# Patient Record
Sex: MALE | Race: WHITE | NOT HISPANIC OR LATINO | Employment: OTHER | URBAN - METROPOLITAN AREA
[De-identification: names, ages, dates, MRNs, and addresses within clinical notes are randomized per-mention and may not be internally consistent; named-entity substitution may affect disease eponyms.]

---

## 2021-04-13 ENCOUNTER — HOSPITAL ENCOUNTER (INPATIENT)
Facility: HOSPITAL | Age: 71
LOS: 4 days | Discharge: HOME | End: 2021-04-17
Attending: PHYSICAL MEDICINE & REHABILITATION | Admitting: PHYSICAL MEDICINE & REHABILITATION

## 2021-04-13 PROBLEM — I61.9: Status: ACTIVE | Noted: 2021-04-13

## 2021-04-13 PROCEDURE — 94799 UNLISTED PULMONARY SVC/PX: CPT

## 2021-04-13 PROCEDURE — U0004 COV-19 TEST NON-CDC HGH THRU: HCPCS | Performed by: PHYSICAL MEDICINE & REHABILITATION

## 2021-04-13 PROCEDURE — 94640 AIRWAY INHALATION TREATMENT: CPT

## 2021-04-13 PROCEDURE — 94664 DEMO&/EVAL PT USE INHALER: CPT

## 2021-04-13 RX ORDER — IPRATROPIUM BROMIDE AND ALBUTEROL SULFATE 2.5; .5 MG/3ML; MG/3ML
3 SOLUTION RESPIRATORY (INHALATION) EVERY 6 HOURS PRN
Status: DISCONTINUED | OUTPATIENT
Start: 2021-04-13 | End: 2021-04-17 | Stop reason: HOSPADM

## 2021-04-13 RX ORDER — BUDESONIDE AND FORMOTEROL FUMARATE DIHYDRATE 160; 4.5 UG/1; UG/1
2 AEROSOL RESPIRATORY (INHALATION)
Status: DISCONTINUED | OUTPATIENT
Start: 2021-04-13 | End: 2021-04-17 | Stop reason: HOSPADM

## 2021-04-13 RX ORDER — EZETIMIBE 10 MG/1
10 TABLET ORAL DAILY
COMMUNITY
Start: 2021-02-08 | End: 2022-02-08

## 2021-04-13 RX ORDER — METOPROLOL TARTRATE 50 MG/1
100 TABLET, FILM COATED ORAL EVERY 12 HOURS SCHEDULED
Status: DISCONTINUED | OUTPATIENT
Start: 2021-04-13 | End: 2021-04-17 | Stop reason: HOSPADM

## 2021-04-13 RX ORDER — QUETIAPINE FUMARATE 25 MG/1
12.5 TABLET, FILM COATED ORAL NIGHTLY PRN
Status: DISCONTINUED | OUTPATIENT
Start: 2021-04-13 | End: 2021-04-17 | Stop reason: HOSPADM

## 2021-04-13 RX ORDER — METOPROLOL TARTRATE 100 MG/1
100 TABLET ORAL 2 TIMES DAILY
Status: ON HOLD | COMMUNITY
Start: 2021-04-13 | End: 2021-04-15

## 2021-04-13 RX ORDER — QUETIAPINE FUMARATE 25 MG/1
12.5 TABLET, FILM COATED ORAL NIGHTLY PRN
Status: ON HOLD | COMMUNITY
Start: 2021-04-13 | End: 2021-04-15

## 2021-04-13 RX ORDER — HYDRALAZINE HYDROCHLORIDE 10 MG/1
10 TABLET, FILM COATED ORAL EVERY 6 HOURS PRN
Status: DISCONTINUED | OUTPATIENT
Start: 2021-04-13 | End: 2021-04-14

## 2021-04-13 RX ORDER — BUDESONIDE AND FORMOTEROL FUMARATE DIHYDRATE 160; 4.5 UG/1; UG/1
2 AEROSOL RESPIRATORY (INHALATION) 2 TIMES DAILY
Status: ON HOLD | COMMUNITY
Start: 2021-04-13 | End: 2021-04-15

## 2021-04-13 RX ORDER — FLUTICASONE PROPIONATE 50 MCG
1 SPRAY, SUSPENSION (ML) NASAL DAILY
Status: DISCONTINUED | OUTPATIENT
Start: 2021-04-14 | End: 2021-04-17 | Stop reason: HOSPADM

## 2021-04-13 RX ORDER — LISINOPRIL 40 MG/1
40 TABLET ORAL DAILY
Status: ON HOLD | COMMUNITY
Start: 2021-04-13 | End: 2021-04-15

## 2021-04-13 RX ORDER — LISINOPRIL 40 MG/1
40 TABLET ORAL
Status: DISCONTINUED | OUTPATIENT
Start: 2021-04-14 | End: 2021-04-17 | Stop reason: HOSPADM

## 2021-04-13 RX ADMIN — METOPROLOL TARTRATE 100 MG: 50 TABLET, FILM COATED ORAL at 20:01

## 2021-04-13 RX ADMIN — BUDESONIDE AND FORMOTEROL FUMARATE DIHYDRATE 2 PUFF: 160; 4.5 AEROSOL RESPIRATORY (INHALATION) at 20:42

## 2021-04-13 RX ADMIN — QUETIAPINE FUMARATE 12.5 MG: 25 TABLET ORAL at 22:47

## 2021-04-13 RX ADMIN — HYDRALAZINE HYDROCHLORIDE 10 MG: 10 TABLET, FILM COATED ORAL at 21:29

## 2021-04-13 NOTE — PLAN OF CARE
Goal Outcome Evaluation:  Plan of Care Reviewed With: patient     Outcome Summary: A&OX4. Calm, cooperative, and pleasant. Ate dinner on the unit. Came to the unit at 5:30 P.M. Diet: Reg, healthy heart, thin liquids. Denied pain. Full code. Can walk on own well. No numness or tingling. Continent B&B. Last BM today. Forgetful and disoriented at times.

## 2021-04-13 NOTE — PROGRESS NOTES
Inpatient Rehabilitation Plan of Care Note    Plan of Care  Care Plan Reviewed - Updates as Follows    Psychosocial    [RN] Coping/Adjustment(Active)  Current Status(04/13/2021): Expresses appropriate coping  Weekly Goal(04/20/2021): Allow opportunity to express concerns regarding current  status  Discharge Goal: Demonstrate healthy coping strategies        Safety    [RN] Potential for Injury(Active)  Current Status(04/13/2021): Pt is forgetful/impulsive  Weekly Goal(04/20/2021): Free from falls  Discharge Goal: Pt/family will be aware of risk of falls and safety in the home        Sphincter Control    [RN] Bladder Management(Active)  Current Status(04/13/2021): Continent 100%  Weekly Goal(04/20/2021): Continent 100%  Discharge Goal: Continent 100%    [RN] Bowel Management(Active)  Current Status(04/13/2021): Continent 100%  Weekly Goal(04/20/2021): Continent 100%  Discharge Goal: Continent 100%    Signed by: Ishmael Correa RN

## 2021-04-13 NOTE — H&P
[facility]   HISTORY AND PHYSICAL    Patient Name: hCad Hansen  : 1950  MRN: [id number]  Primary Care Physician: Marianna Billings APRN  Date of admission: 2021    Subjective   Subjective     Chief Complaint:   Status post right frontoparietal intraparenchymal hemorrhage-4.5 x 1.7 cm-2021  Hypertension-lisinopril/metoprolol/as needed hydralazine  Emphysema-bronchodilator inhaler  Impaired cognition/mobility/self-care  Sundowning-Seroquel low-dose at night  DVT prophylaxis-SCDs      History of Present Illness  Patient is a 71-year-old male who was admitted to [facility] on 2021 with a right frontal intraparenchymal hemorrhage with surrounding edema and brain compression.  Neurology was consulted.  Diagnostic angiogram did not reveal any source of the bleeding.  No aneurysm.  Plans for repeat MRI as an outpatient in 3 months.  Patient was initially on a Cardene drip for hypertension.  Started on lisinopril which was titrated up.  Also on a beta-blocker added as well.  He has had as needed labetalol and hydralazine.  Overall goal systolic blood pressure less than 140.  He had prolonged QT that improved with IV magnesium.  Has had sundowning that improved with low-dose Seroquel.  His wife is to stay with him the first night on the rehab unit.  He is in a room close to the nurses station.  Emphysema was noted on his chest x-ray.  Pulmonary recommended outpatient pulmonary function test and also 6-minute walk test before discharge from rehab.    The patient has shown improvement in his cognition.  He is oriented.  Does not describe any significant headache.  Does not describe any focal vision complaints or focal weakness or numbness.  He has had some impulsivity.  His wife attributes some of that to being on the diuretic at the outside hospital.  Has had expressive aphasia that shown improvement.  He is ambulated contact-guard min assist, unsteady at times.  Required  assistance with lower body activities of daily living.  Given his functional impairments and comorbidities he is admitted for acute inpatient rehabilitation.  Review of Systems   10 point review of systems reviewed as per his HPI.  He has had a rash on his back for the past month, evaluated by dermatologist as an outpatient with biopsy.  No significant headache.  No vision changes.  No swallowing complaints.  Comfortable with his breathing.  No abdominal complaints.  No bladder complaints.  Denies any focal weakness.  Personal History     Past Medical History:   Diagnosis Date   • Arthritis    • Asthma    • Elevated cholesterol    • Hypertension    • Stroke (CMS/HCC)        Past Surgical History:   Procedure Laterality Date   • ABDOMINAL SURGERY     • APPENDECTOMY     • COLONOSCOPY     • EYE SURGERY     • FRACTURE SURGERY     • TONSILLECTOMY         Family History: family history includes Cancer in his brother and father; Diabetes in his maternal grandmother. Otherwise pertinent FHx was reviewed and not pertinent to current issue.    Social History:  reports that he has been smoking. He started smoking about 26 years ago. He has a 6.25 pack-year smoking history. He has never used smokeless tobacco. He reports current alcohol use of about 1.0 standard drinks of alcohol per week. He reports that he does not use drugs.  Patient is  lives with his wife in a ranch style home, 2 steps to enter.  Home Medications:  QUEtiapine, budesonide-formoterol, ezetimibe, lisinopril, and metoprolol tartrate      Allergies:  Allergies   Allergen Reactions   • Pravastatin Hives     Rash/ itching    • Hydrochlorothiazide Rash   Scheduled Meds:budesonide-formoterol, 2 puff, Inhalation, BID - RT  fluticasone, 1 spray, Each Nare, Daily  lisinopril, 40 mg, Oral, Q24H  metoprolol tartrate, 100 mg, Oral, Q12H      Continuous Infusions:   PRN Meds:.hydrALAZINE  •  ipratropium-albuterol  •  QUEtiapine      Objective    Objective      Vitals:  Temp:  [97 °F (36.1 °C)] 97 °F (36.1 °C)  Heart Rate:  [68] 68  Resp:  [18] 18  BP: (181)/(84) 181/84    Physical Exam  MENTAL STATUS -  AWAKE / ALERT  HEENT- NCAT, PUPILS EQUALLY ROUND, SCLERAE ANICTERIC, CONJUNCTIVAE PINK, OP MOIST, NO JVD, EARS UNREMARKABLE EXTERNALLY  LUNGS - CTA, NO WHEEZES, RALES OR RHONCHI  HEART- RRR, NO RUB, MURMUR, OR GALLOP  ABD - NORMOACTIVE BOWEL SOUNDS, SOFT, NT. NO HEPATOSPLENOMEGALY APPRECIATED  EXT - NO EDEMA OR CYANOSIS  NEURO -oriented to person place time and situation.  Appropriate.  Speech is fluent.  Visual fields intact in all 4 quadrants.  Extraocular movements intact.  Good coordination in his hands.  MOTOR EXAM - RUE/RLE 5/5. LUE/LLE 5/5.      Result Review    Result Review:    April 12-sodium 136, potassium 3.3, chloride 105, carbon dioxide 27, glucose 101, blood urea nitrogen 16, creatinine 1.13  April 8-hemoglobin A1c 5.3  April 7-WBC 14.4, hemoglobin 12.9, hematocrit 30.6, platelets 188.  April 5-total protein 7.4, BUN 4.4, calcium 8.9, total bili 0.6, AST 31, ALT 26, alkaline phosphatase 79     CT head without contrast,     04/06/2021 at 0537 hours     HISTORY: Right frontal intraparenchymal hemorrhage.         TECHNIQUE: Multiple axial images were obtained from the skullbase through the vertex. Images were obtained without IV contrast. The radiation dose reduction technique was used to obtain ALARA for the exam.     COMPARISON: April 5, 2021     FINDINGS: There has been no interval change in the right frontal intrathecal hematoma. No new hemorrhage is demonstrated. No intraventricular hemorrhage is seen. Mass effect is seen upon the right frontal horn. Small amount of subarachnoid hemorrhage is   demonstrated.     Motion mildly degrades the study. Stable ischemic white matter disease and atrophy. Bilateral lens extractions have been performed. The paranasal sinuses are clear. No mastoid or middle ear effusion is demonstrated.     IMPRESSION:   1. Stable  right frontal intraparenchymal hematoma  ================================    MRI of the brain with and without contrast, MRI cervical spine with and without contrast,     04/05/2021 at 1330 hours.     HISTORY: Headache, chronic, no new features; Altered mental status.  Neck pain, abnormal neuro exam. According to the patient's electronic medical record, the patient has acute intracranial hemorrhage on recent CT imaging.     TECHNIQUE: Multiplanar and multisequence imaging was obtained of the brain and cervical spine on a 3 Karime magnet. Images were obtained pre-and-post administration of IV gadolinium.     COMPARISON: CT head and CTA head/neck exams dated 4/5/2021.     FINDINGS:     BRAIN: Images are degraded by motion artifact on multiple sequences. A heterogeneous parenchymal hematoma is reidentified centered within the anterior/paramedian right frontal lobe, which is not significantly changed in size compared to the most recent   CT head exam from 4/5/2021, measuring approximately 4.5 x 1.7 cm (reference image 25/series 6). An internal fluid/fluid layer is demonstrated. There is significant associated abnormal nodular enhancement is demonstrated. There is surrounding parenchymal   T2/FLAIR signal, compatible with edema, and persistent associated local mass effect with local sulcal effacement and partial effacement of the anterior body of the right lateral ventricle.     A small amount of blood products is identified layering within the dependent portions of the lateral ventricles, which may be related to some redistribution. Thin, subtle extra-axial intrinsic T1 shortening, gradient susceptibility artifact, and   incomplete suppression of FLAIR signal intensity is demonstrated along the bilateral frontal convexities, compatible with thin subdural hematomas.  There is susceptibility artifact extending along the anterior/inferior interhemispheric falx and adjacent   cerebral sulci, also compatible with small  amounts of subdural and subarachnoid hemorrhage. Gradient susceptibility artifact is also demonstrated along a few left frontoparietal, and to lesser degree left temporal, sulci (reference image 47/series 9),   which may represent sequela of prior subarachnoid hemorrhage. A subcentimeter focus of intrinsic T1 shortening with corresponding heterogeneous gradient susceptibility artifact is identified within the anterior left frontal lobe (reference image   39/series 9 and image 25/series 5), which may represent a small subacute parenchymal hematoma, which is not associated with significant mass effect or significant surrounding parenchymal edema.     There is no evidence of hydrocephalus. There is no significant midline shift. The basal cisterns are intact.       Foci of hyperintense T2/FLAIR signal are identified within the periventricular and subcortical supratentorial white matter, which are nonspecific, but likely represent a background of mild to moderate chronic small vessel ischemic changes. There is no   abnormal parenchymal enhancement.  There are additional punctate foci of gradient susceptibility artifact within the bilateral temporal lobes, left occipital lobe, and left parietal lobe, which may represent small foci of chronic hemosiderin deposition   and/or mineralization. There is no parenchymal restricted diffusion to suggest acute ischemia/infarct.       Post procedural changes of prior bilateral ocular lens extractions are noted. The orbits and pituitary gland are within normal limits. There is no evidence of Chiari malformation or syrinx. The sagittal T1 image demonstrates normal marrow signal   intensity in the calvarium, skull base, and upper cervical spine.  The expected flow voids for the proximal intracranial arteries and dural sinuses are maintained.  There is mild mucosal thickening within scattered ethmoid air cells as well as the   frontal, maxillary, and sphenoid sinuses. No significant  fluid is seen in the mastoid air cells.       IMPRESSION:   1. A parenchymal hematoma is reidentified centered within the anterior/paramedian right frontal lobe, not significantly changed in size or configuration compared to the 4/5/2021 CT head exam at 1334 hours. There is similar associated parenchymal edema   and local mass effect, without significant midline shift. No associated abnormal nodular enhancement is demonstrated.   2. There is a small amount of intraventricular hemorrhage with thin bilateral subdural hematomas overlying the frontal convexities and anterior interhemispheric falx, without significant associated mass effect or hydrocephalus. Continued follow-up is   recommended.   3. The subcentimeter focus of heterogeneous intrinsic T1 shortening and heterogeneous gradient susceptibility artifact within the left frontal lobe could represent a small subacute parenchymal hematoma, which is not associated with significant mass   effect. Continued attention to this region on follow-up imaging is recommended.   4. Foci of parenchymal susceptibility artifact and evidence of prior subarachnoid hemorrhage are nonspecific findings, but could be seen in the setting of cerebral amyloid angiopathy, among other etiologies. Foci of parenchymal susceptibility artifact   can also be seen in the setting of chronic hypertensive microhemorrhage. Clinical correlation is recommended.   5. Findings compatible with a background of mild to moderate chronic small vessel ischemic changes.       Assessment/Plan   Assessment / Plan        Status post right frontoparietal intraparenchymal hemorrhage-4.5 x 1.7 cm-April 5, 2021  Impaired cognition/impaired mobility/impaired self-care   hypertension-lisinopril/metoprolol/as needed hydralazine  Emphysema-bronchodilator inhaler  Impaired cognition/mobility/self-care  Sundowning-Seroquel low-dose at night  DVT prophylaxis-SCDs    Now admit for comprehensive acute inpatient rehabilitation  .  This would be an interdisciplinary program with physical therapy 1 hour,  occupational therapy 1 hour, and speech therapy 1 hour, 5 days a week.  Rehabilitation nursing for carryover, monitoring of hypertension and neurologic   status, bowel and bladder, and skin  Ongoing physician follow-up.  Weekly team conferences.  Goals are to achieve a level of supervision with  mobility and self-care and improved balance.   Rehabilitation prognosis fair.  Medical prognosis fair.  Estimated length of stay is approximately 10 days, but is only an estimation.   The patient's functional status and clinical status is unchanged from preadmission assessment and the patient continues appropriate for acute inpatient rehabilitation.  Goal is for home with outpatient   therapies.  Barrier to discharge: Impaired mobility self-care- work on balance transfers gait ADLs to overcome.     Admission Status:  I believe this patient meets inpatient status.    Electronically signed by Nomi Ibarra MD, 04/13/21, 7:18 PM EDT.    During rounds, used appropriate personal protective equipment including mask and gloves.  Additional gown if indicated.  Mask used was standard procedure mask. Appropriate PPE was worn during the entire visit.  Hand hygiene was completed before and after.

## 2021-04-13 NOTE — PROGRESS NOTES
SECTION GG    Eating Performance: [place] sets up or cleans up; patient completes activity.  [place] assists only prior to or following the activity.    Eating Discharge Goals: Patient completed the activities by him/herself with no  assistance from a helper.    Signed by: Ishmael Correa RN

## 2021-04-14 LAB
ALBUMIN SERPL-MCNC: 3.1 G/DL (ref 3.5–5.2)
ALBUMIN/GLOB SERPL: 1.1 G/DL
ALP SERPL-CCNC: 131 U/L (ref 39–117)
ALT SERPL W P-5'-P-CCNC: 131 U/L (ref 1–41)
ANION GAP SERPL CALCULATED.3IONS-SCNC: 7 MMOL/L (ref 5–15)
AST SERPL-CCNC: 68 U/L (ref 1–40)
BASOPHILS # BLD AUTO: 0.1 10*3/MM3 (ref 0–0.2)
BASOPHILS NFR BLD AUTO: 1 % (ref 0–1.5)
BILIRUB SERPL-MCNC: 0.3 MG/DL (ref 0–1.2)
BUN SERPL-MCNC: 17 MG/DL (ref 8–23)
BUN/CREAT SERPL: 16.7 (ref 7–25)
CALCIUM SPEC-SCNC: 8.7 MG/DL (ref 8.6–10.5)
CHLORIDE SERPL-SCNC: 102 MMOL/L (ref 98–107)
CO2 SERPL-SCNC: 29 MMOL/L (ref 22–29)
CREAT SERPL-MCNC: 1.02 MG/DL (ref 0.76–1.27)
DEPRECATED RDW RBC AUTO: 43.7 FL (ref 37–54)
EOSINOPHIL # BLD AUTO: 0.81 10*3/MM3 (ref 0–0.4)
EOSINOPHIL NFR BLD AUTO: 7.9 % (ref 0.3–6.2)
ERYTHROCYTE [DISTWIDTH] IN BLOOD BY AUTOMATED COUNT: 12.3 % (ref 12.3–15.4)
GFR SERPL CREATININE-BSD FRML MDRD: 72 ML/MIN/1.73
GLOBULIN UR ELPH-MCNC: 2.7 GM/DL
GLUCOSE SERPL-MCNC: 103 MG/DL (ref 65–99)
HCT VFR BLD AUTO: 37.3 % (ref 37.5–51)
HGB BLD-MCNC: 12.8 G/DL (ref 13–17.7)
IMM GRANULOCYTES # BLD AUTO: 0.07 10*3/MM3 (ref 0–0.05)
IMM GRANULOCYTES NFR BLD AUTO: 0.7 % (ref 0–0.5)
LYMPHOCYTES # BLD AUTO: 1.72 10*3/MM3 (ref 0.7–3.1)
LYMPHOCYTES NFR BLD AUTO: 16.7 % (ref 19.6–45.3)
MCH RBC QN AUTO: 33.2 PG (ref 26.6–33)
MCHC RBC AUTO-ENTMCNC: 34.3 G/DL (ref 31.5–35.7)
MCV RBC AUTO: 96.9 FL (ref 79–97)
MONOCYTES # BLD AUTO: 1.32 10*3/MM3 (ref 0.1–0.9)
MONOCYTES NFR BLD AUTO: 12.9 % (ref 5–12)
NEUTROPHILS NFR BLD AUTO: 6.25 10*3/MM3 (ref 1.7–7)
NEUTROPHILS NFR BLD AUTO: 60.8 % (ref 42.7–76)
NRBC BLD AUTO-RTO: 0 /100 WBC (ref 0–0.2)
PLATELET # BLD AUTO: 255 10*3/MM3 (ref 140–450)
PMV BLD AUTO: 10.6 FL (ref 6–12)
POTASSIUM SERPL-SCNC: 4.4 MMOL/L (ref 3.5–5.2)
PROT SERPL-MCNC: 5.8 G/DL (ref 6–8.5)
RBC # BLD AUTO: 3.85 10*6/MM3 (ref 4.14–5.8)
SARS-COV-2 RNA RESP QL NAA+PROBE: NOT DETECTED
SODIUM SERPL-SCNC: 138 MMOL/L (ref 136–145)
WBC # BLD AUTO: 10.27 10*3/MM3 (ref 3.4–10.8)

## 2021-04-14 PROCEDURE — 94799 UNLISTED PULMONARY SVC/PX: CPT

## 2021-04-14 PROCEDURE — 97161 PT EVAL LOW COMPLEX 20 MIN: CPT

## 2021-04-14 PROCEDURE — 80053 COMPREHEN METABOLIC PANEL: CPT | Performed by: PHYSICAL MEDICINE & REHABILITATION

## 2021-04-14 PROCEDURE — 97166 OT EVAL MOD COMPLEX 45 MIN: CPT | Performed by: OCCUPATIONAL THERAPIST

## 2021-04-14 PROCEDURE — 96125 COGNITIVE TEST BY HC PRO: CPT

## 2021-04-14 PROCEDURE — 97112 NEUROMUSCULAR REEDUCATION: CPT

## 2021-04-14 PROCEDURE — 85025 COMPLETE CBC W/AUTO DIFF WBC: CPT | Performed by: PHYSICAL MEDICINE & REHABILITATION

## 2021-04-14 PROCEDURE — 97535 SELF CARE MNGMENT TRAINING: CPT | Performed by: OCCUPATIONAL THERAPIST

## 2021-04-14 RX ORDER — HYDRALAZINE HYDROCHLORIDE 10 MG/1
20 TABLET, FILM COATED ORAL EVERY 6 HOURS PRN
Status: DISCONTINUED | OUTPATIENT
Start: 2021-04-14 | End: 2021-04-17 | Stop reason: HOSPADM

## 2021-04-14 RX ORDER — AMLODIPINE BESYLATE 5 MG/1
5 TABLET ORAL
Status: DISCONTINUED | OUTPATIENT
Start: 2021-04-14 | End: 2021-04-17 | Stop reason: HOSPADM

## 2021-04-14 RX ADMIN — LISINOPRIL 40 MG: 40 TABLET ORAL at 07:40

## 2021-04-14 RX ADMIN — AMLODIPINE BESYLATE 5 MG: 5 TABLET ORAL at 21:17

## 2021-04-14 RX ADMIN — METOPROLOL TARTRATE 100 MG: 50 TABLET, FILM COATED ORAL at 21:17

## 2021-04-14 RX ADMIN — BUDESONIDE AND FORMOTEROL FUMARATE DIHYDRATE 2 PUFF: 160; 4.5 AEROSOL RESPIRATORY (INHALATION) at 22:29

## 2021-04-14 RX ADMIN — BUDESONIDE AND FORMOTEROL FUMARATE DIHYDRATE 2 PUFF: 160; 4.5 AEROSOL RESPIRATORY (INHALATION) at 07:43

## 2021-04-14 RX ADMIN — QUETIAPINE FUMARATE 12.5 MG: 25 TABLET ORAL at 21:17

## 2021-04-14 RX ADMIN — METOPROLOL TARTRATE 100 MG: 50 TABLET, FILM COATED ORAL at 07:39

## 2021-04-14 RX ADMIN — FLUTICASONE PROPIONATE 1 SPRAY: 50 SPRAY, METERED NASAL at 07:35

## 2021-04-14 RX ADMIN — HYDRALAZINE HYDROCHLORIDE 10 MG: 10 TABLET, FILM COATED ORAL at 15:48

## 2021-04-14 NOTE — PROGRESS NOTES
"Section B. Hearing, Speech, Vision: Expression of Ideas and Wants: Expresses  complex messages without difficulty and with speech that is clear and easy to  understand.  Understanding Verbal and Non-Verbal Content: Understands: Clear comprehension  without cues or repetitions.    Section C. Cognitive Patterns: Brief Interview for Mental Status (BIMS) was  conducted.  Repetition of Three Words: Three words  Able to report correct year: Correct  Able to report correct month: Accurate within 5 days  Able to report correct day of the week: Correct  Able to recall \"sock\": Yes, no cue required  Able to recall \"blue\": Yes, no cue required  Able to recall \"bed\": Yes, no cue required    BIMS SUMMARY SCORE: 15 Cognitively intact Patient was able to complete the Brief  Interview for Mental Status    Signed by: Janice Morocho, SLP    "

## 2021-04-14 NOTE — PLAN OF CARE
Goal Outcome Evaluation:      Slept poorly. Up to void every 1hr., denies burning or pain.  Wife at bedside. Answered orientation questions, but forgetful & disoriented at times. Wears red arm band. Gait mostly steady without equipment. Continent of B&B. Meds whole with water.

## 2021-04-14 NOTE — THERAPY EVALUATION
Inpatient Rehabilitation - Speech Language Pathology Initial Evaluation    [facility]     Patient Name: Chad Hansen  : 1950  MRN: [id number]    Today's Date: 2021                   Admit Date: 2021       Visit Dx:    No diagnosis found.    Patient Active Problem List   Diagnosis   • Nontraumatic acute cerebral hemorrhage (CMS/HCC)       Past Medical History:   Diagnosis Date   • Arthritis    • Asthma    • Elevated cholesterol    • Hypertension    • Stroke (CMS/HCC)        Past Surgical History:   Procedure Laterality Date   • ABDOMINAL SURGERY     • APPENDECTOMY     • COLONOSCOPY     • EYE SURGERY     • FRACTURE SURGERY     • TONSILLECTOMY       Patient was wearing a face mask during this therapy encounter. Therapist used appropriate personal protective equipment including mask, eye protection and gloves.  Mask used was standard procedure mask. Appropriate PPE was worn during the entire therapy session. Hand hygiene was completed before and after therapy session. Patient is not in enhanced droplet precautions.                SLP EVALUATION (last 72 hours)      SLP SLC Evaluation     Row Name 21 0830       Communication Assessment/Intervention    Document Type  evaluation  -AL    Subjective Information  no complaints  -AL    Patient Observations  alert;cooperative  -AL    Patient/Family/Caregiver Comments/Observations  Pt participated well. Denies changes in cognition or word finding.  -AL    Patient Effort  good  -AL    Symptoms Noted During/After Treatment  none  -AL       General Information    Patient Profile Reviewed  yes  -AL    Pertinent History Of Current Problem  Pt admitted to rehab under diagnosis Intraparenchymal Hemorrhage. CT at previous hospital  showed acute right frontal intraparenchymal hemorrhage causing local mass effect and moderate surrounding vasogenic edema, additional extra-axial hemorrhage tracking along falx, along with intraventricular hemorrhage right  occipital horn 4th ventrical.   -AL    Precautions/Limitations, Vision  WFL;for purposes of eval  -AL    Precautions/Limitations, Hearing  other (see comments) requires repetitions, suspect [place]  -AL    Patient Level of Education  Unknown; pt retired  -AL    Prior Level of Function-Communication  WFL  -AL    Plans/Goals Discussed with  patient  -AL    Barriers to Rehab  none identified  -AL    Patient's Goals for Discharge  return to all previous roles/activities  -AL    Standardized Assessment Used  CLQT  -AL       Motor Speech Assessment/Intervention    Motor Speech Function  WNL  -AL       Cognitive Assessment Intervention- SLP    Cognition, Comment  BIMS administered. Pt recalled 3/3 unrelated words immediately and after 2 minute delay. Pt oriented to month, year and WILLIAN. Pt unable to recall details of stroke.  Short-term memory for 3 errands after 10 minutes, complex verbal situational problem solving and abstract convergent thinking appeared WFL. Attention to detail for medicine label appeared WFL. Attention to detial for bill pay: required MOD-NO cues (3/8 with NO cues, 8/8 with MIN-MOD cues). Moderately complex temporal problem solvin% with NO cues, 100% with MIN-MOD cues. Suspect working memory deficits impacted.    -AL       Standardized Tests    Cognitive/Memory Tests  CLQT: Cognitive Linguistic Quick Test  -AL       CLQT (The Cognitive Linguistic Quick Test)    Attention Domain Score  122  -AL    Attention Severity Rating  3: Mild  -AL    Memory Domain Score  108  -AL    Memory Severity Rating  2: Moderate  -AL    Executive Function Domain Score  17  -AL    Executive Function Severity Rating  3: Mild  -AL    Language Domain Score  25  -AL    Language Severity Rating  3: Mild  -AL    Visuospatial Domain Score  57  -AL    Visuospatial Severity Rating  3: Mild  -AL    Clock Drawing Total Score  13  -AL    Clock Drawing Severity Rating  WNL  -AL    Composite Severity Rating  2.8  -AL    Composite  Severity Rating Range  3.4 - 2.5: Mild  -AL    CLQT Comments  Pt scored below the criterion cut-off score on symbol cancellation, story retelling, symbol trails, generative naming, design memory, and design generation. Reduced attention to detail noted; however, no overt inattention to left or right observed. Suspect [place] status impacted performance on story retelling task.   -AL       SLP Clinical Impressions    SLP Diagnosis  Mild cognitive-linguistic impairment  -AL    Rehab Potential/Prognosis  excellent  -AL    SLC Criteria for Skilled Therapy Interventions Met  yes  -AL    Functional Impact  need frequent supervision;difficulty completing home management task  -AL       Recommendations    Therapy Frequency (SLP SLC)  2 times/day;5 days per week  -AL    Predicted Duration Therapy Intervention (Days)  until discharge  -AL    Anticipated Discharge Disposition (SLP)  home with  services  -AL       Communication Treatment Objective and Progress Goals (SLP)    Cognitive Linguistic Treatment Objectives  Cognitive Linguistic Treatment Objectives (Group)  -AL       Cognitive Linguistic Treatment Objectives    Attention Selection  attention, SLP goal 1  -AL    Memory Skills Selection  memory skills, SLP goal 1  -AL    Organizational Skills Selection  organizational skills, SLP goal 1  -AL    Reasoning Selection  reasoning, SLP goal 1  -AL    Functional Math Skills Selection  functional math skills, SLP goal 1  -AL    Executive Function Skills Selection  executive function skills, SLP goal 1  -AL       Attention Goal 1 (SLP)    Improve Attention by Goal 1 (SLP)  complete selective attention task;80%;independently (over 90% accuracy)  -AL    Time Frame (Attention Goal 1, SLP)  1 week  -AL       Memory Skills Goal 1 (SLP)    Improve Memory Skills Through Goal 1 (SLP)  listen to a paragraph and answer questions;recall details of the day;80%;independently (over 90% accuracy)  -AL    Time Frame (Memory Skills Goal 1, SLP)  1  week  -AL       Organizational Skills Goal 1 (SLP)    Improve Thought Organization Through Goal 1 (SLP)  completing a divergent naming task;naming similarities and differences;80%;independently (over 90% accuracy)  -AL    Time Frame (Thought Organization Skills Goal 1, SLP)  1 week  -AL       Reasoning Goal 1 (SLP)    Improve Reasoning Through Goal 1 (SLP)  complete deductive reasoning task;complete mental flexibility task;80%;independently (over 90% accuracy)  -AL    Time Frame (Reasoning Goal 1, SLP)  1 week  -AL       Functional Math Skills Goal 1 (SLP)    Improve Functional Math Skills Through Goal 1 (SLP)  complete word problems involving time;complete word problems involving money;80%;independently (over 90% accuracy)  -AL    Time Frame (Functional Math Skills Goal 1, SLP)  1 week  -AL       Executive Functional Skills Goal 1 (SLP)    Improve Executive Function Skills Goal 1 (SLP)  demonstrate awareness of deficit;identify strategies, strengths, limitations;home management activity;80%;independently (over 90% accuracy)  -AL    Time Frame (Executive Function Skills Goal 1, SLP)  1 week  -AL      User Key  (r) = Recorded By, (t) = Taken By, (c) = Cosigned By    Initials Name Effective Dates    Janice Sanchez, MS CCC-SLP 08/30/19 -              EDUCATION    The patient has been educated in the following areas:       Cognitive Impairment.      SLP Recommendation and Plan      SLP Diagnosis: Mild cognitive-linguistic impairment     Rehab Potential/Prognosis: excellent     Anticipated Discharge Disposition (SLP): home with OP services        Predicted Duration Therapy Intervention (Days): until discharge           Progress: improving  Outcome Summary: Cognitive-Linguistic Quick Test (CLQT) administered. Pt scored an overall composite severity rating of 2.8 out of 4, indicating a mild cognitive-linguistic impairment for his age. He scored mildly impaired in the domains of attention, executive functions,  language and visuospatial skills and moderately impaired in the domain of memory. He exhibited difficulty with story retelling, alternating attention for symbol trails, symbol cancellation, generative naming, design memory and design generation.          SLP GOALS     Row Name 04/14/21 0830             Attention Goal 1 (SLP)    Improve Attention by Goal 1 (SLP)  complete selective attention task;80%;independently (over 90% accuracy)  -AL      Time Frame (Attention Goal 1, SLP)  1 week  -AL         Memory Skills Goal 1 (SLP)    Improve Memory Skills Through Goal 1 (SLP)  listen to a paragraph and answer questions;recall details of the day;80%;independently (over 90% accuracy)  -AL      Time Frame (Memory Skills Goal 1, SLP)  1 week  -AL         Organizational Skills Goal 1 (SLP)    Improve Thought Organization Through Goal 1 (SLP)  completing a divergent naming task;naming similarities and differences;80%;independently (over 90% accuracy)  -AL      Time Frame (Thought Organization Skills Goal 1, SLP)  1 week  -AL         Reasoning Goal 1 (SLP)    Improve Reasoning Through Goal 1 (SLP)  complete deductive reasoning task;complete mental flexibility task;80%;independently (over 90% accuracy)  -AL      Time Frame (Reasoning Goal 1, SLP)  1 week  -AL         Functional Math Skills Goal 1 (SLP)    Improve Functional Math Skills Through Goal 1 (SLP)  complete word problems involving time;complete word problems involving money;80%;independently (over 90% accuracy)  -AL      Time Frame (Functional Math Skills Goal 1, SLP)  1 week  -AL         Executive Functional Skills Goal 1 (SLP)    Improve Executive Function Skills Goal 1 (SLP)  demonstrate awareness of deficit;identify strategies, strengths, limitations;home management activity;80%;independently (over 90% accuracy)  -AL      Time Frame (Executive Function Skills Goal 1, SLP)  1 week  -AL        User Key  (r) = Recorded By, (t) = Taken By, (c) = Cosigned By    Initials  Name Provider Type    Janice Sanchez MS CCC-SLP Speech and Language Pathologist             SLP Outcome Measures (last 72 hours)      SLP Outcome Measures     Row Name 04/14/21 1500             SLP Outcome Measures    Outcome Measure Used?  Adult NOMS  -AL         Adult FCM Scores    FCM Chosen  Attention;Memory  -AL      Attention FCM Score  5  -AL      Memory FCM Score  5  -AL        User Key  (r) = Recorded By, (t) = Taken By, (c) = Cosigned By    Initials Name Effective Dates    Janice Sanchez MS CCC-SLP 08/30/19 -                   Time Calculation:       Time Calculation- SLP     Row Name 04/14/21 1554 04/14/21 1436          Time Calculation- SLP    SLP Start Time  1230  -AL  0830  -AL     SLP Stop Time  1300  -AL  0900  -AL     SLP Time Calculation (min)  30 min  -AL  30 min  -AL       User Key  (r) = Recorded By, (t) = Taken By, (c) = Cosigned By    Initials Name Provider Type    Janice Sanchez MS CCC-SLP Speech and Language Pathologist            Therapy Charges for Today     Code Description Service Date Service Provider Modifiers Qty    [id number]  ST STD COG PERF TEST PER HOUR 4/14/2021 Janice Morocho MS CCC-SLP GN 1               ADULT NOMS (last 72 hours)      Adult NOMS     Row Name 04/14/21 1500                   Adult FCM Scores    FCM Chosen  Attention;Memory  -AL        Attention FCM Score  5  -AL        Memory FCM Score  5  -AL          User Key  (r) = Recorded By, (t) = Taken By, (c) = Cosigned By    Initials Name Effective Dates    Janice Sanchez MS CCC-SLP 08/30/19 -                      Janice Morocho MS CCC-SLP  4/14/2021

## 2021-04-14 NOTE — PROGRESS NOTES
LOS: 1 day   Patient Care Team:  Marianna Billings APRN as PCP - General (Nurse Practitioner)      SALLY KEE  1950    Chief Complaint:   Status post right frontoparietal intraparenchymal hemorrhage-4.5 x 1.7 cm-April 5, 2021  Hypertension-l   Emphysema-bronchodilator inhaler  Impaired cognition/mobility/self-care  Sundowning-Seroquel low-dose at night  DVT prophylaxis-SCDs           Subjective     Denies any headache.  Tolerating therapies.  Does not describe any focal weakness.  Gait 400 feet contact-guard.    Objective     Vitals:    04/14/21 1640   BP: 157/69   Pulse: 89   Resp:    Temp:    SpO2:        PHYSICAL EXAM:   MENTAL STATUS -  AWAKE / ALERT  HEENT-   SCLERAE ANICTERIC, CONJUNCTIVAE PINK,   NO JVD,   LUNGS - CTA, NO WHEEZES, RALES OR RHONCHI  HEART- RRR, NO RUB, MURMUR, OR GALLOP  ABD - NORMOACTIVE BOWEL SOUNDS, SOFT, NT.    EXT - NO EDEMA OR CYANOSIS  NEURO -oriented to person place time and situation.  Appropriate.  Speech is fluent.     Extraocular movements intact.  Good coordination in his hands.  MOTOR EXAM - RUE/RLE 5/5. LUE/LLE 5/5.              MEDICATIONS  Scheduled Meds:amLODIPine, 5 mg, Oral, Daily Before Supper  budesonide-formoterol, 2 puff, Inhalation, BID - RT  fluticasone, 1 spray, Each Nare, Daily  lisinopril, 40 mg, Oral, Q24H  metoprolol tartrate, 100 mg, Oral, Q12H      Continuous Infusions:   PRN Meds:.hydrALAZINE  •  ipratropium-albuterol  •  QUEtiapine      RESULTS  No results found for: POCGLU  Results from last 7 days   Lab Units 04/14/21  1001   WBC 10*3/mm3 10.27   HEMOGLOBIN g/dL 12.8*   HEMATOCRIT % 37.3*   PLATELETS 10*3/mm3 255     Results from last 7 days   Lab Units 04/14/21  1001   SODIUM mmol/L 138   POTASSIUM mmol/L 4.4   CHLORIDE mmol/L 102   CO2 mmol/L 29.0   BUN mg/dL 17   CREATININE mg/dL 1.02   CALCIUM mg/dL 8.7   BILIRUBIN mg/dL 0.3   ALK PHOS U/L 131*   ALT (SGPT) U/L 131*   AST (SGOT) U/L 68*   GLUCOSE mg/dL 103*       April 12-sodium 136, potassium  3.3, chloride 105, carbon dioxide 27, glucose 101, blood urea nitrogen 16, creatinine 1.13  April 8-hemoglobin A1c 5.3  April 7-WBC 14.4, hemoglobin 12.9, hematocrit 30.6, platelets 188.  April 5-total protein 7.4, BUN 4.4, calcium 8.9, total bili 0.6, AST 31, ALT 26, alkaline phosphatase 79    Assessment/Plan     Nontraumatic acute cerebral hemorrhage (CMS/HCC)      Status post right frontoparietal intraparenchymal hemorrhage-4.5 x 1.7 cm-April 5, 2021    Impaired cognition/impaired mobility/impaired self-care     hypertension-April 14-blood pressure elevated 125//50.  Has been on lisinopril 40 mg daily and metoprolol 100 mg twice a day.  On hydralazine 10 mg every 6 hours as needed, systolic blood pressure greater than 160, will increase that to 20 mg as needed dose.  Add amlodipine 5 mg q. evening.    Emphysema-bronchodilator inhaler    Impaired cognition/mobility/self-care    Sundowning-Seroquel low-dose at night    DVT prophylaxis-SCDs    Transaminitis-April 14-AST/ALT increased compared to April 5.  Etiology unclear as no intervening labs.  Present medications reviewed.  Recheck later this week.     Now admit for comprehensive acute inpatient rehabilitation .  This would be an interdisciplinary program with physical therapy 1 hour,  occupational therapy 1 hour, and speech therapy 1 hour, 5 days a week.  Rehabilitation nursing for carryover, monitoring of hypertension and neurologic   status, bowel and bladder, and skin  Ongoing physician follow-up.  Weekly team conferences.  Goals are to achieve a level of supervision with  mobility and self-care and improved balance.   Rehabilitation prognosis fair.  Medical prognosis fair.  Estimated length of stay is approximately 10 days, but is only an estimation.   The patient's functional status and clinical status is unchanged from preadmission assessment and the patient continues appropriate for acute inpatient rehabilitation.  Goal is for home with outpatient    therapies.  Barrier to discharge: Impaired mobility self-care- work on balance transfers gait ADLs to overcome.            Nomi Ibarar MD      During rounds, used appropriate personal protective equipment including mask and gloves.  Additional gown if indicated.  Mask used was standard procedure mask. Appropriate PPE was worn during the entire visit.  Hand hygiene was completed before and after.

## 2021-04-14 NOTE — PROGRESS NOTES
Inpatient Rehabilitation Plan of Care Note    Plan of Care  Care Plan Reviewed - No updates at this time.    Psychosocial    Performed Intervention(s)  Verbalizes needs and concerns      Safety    Performed Intervention(s)  Wife staying overnight to monitor pt  Red arm band  Room close to nurses station  Safety rounds  Bed and/or chair alarm      Sphincter Control    Performed Intervention(s)  Monitor intake and output  Encourage fluid intake    Signed by: Steph Ferreira RN

## 2021-04-14 NOTE — PROGRESS NOTES
Inpatient Rehabilitation Plan of Care Note    Plan of Care  Updated Problems/Interventions  Mobility    [PT] Bed/Chair/Wheelchair(Active)  Current Status(04/14/2021): SBA/Supervision  Weekly Goal(04/17/2021): Indep  Discharge Goal: Indep    [PT] Walk(Active)  Current Status(04/14/2021): 400 ft SBA- CGA  Weekly Goal(04/17/2021): on unit with supervision  Discharge Goal: 500 ft Supervision    [PT] Stairs(Active)  Current Status(04/14/2021): 12 HRs SBA/CGA  Weekly Goal(04/17/2021): PT only  Discharge Goal: 12 HR supervision    Signed by: Carrie Israel, PT

## 2021-04-14 NOTE — PROGRESS NOTES
Inpatient Rehabilitation Functional Measures Assessment and Plan of Care    Plan of Care  Updated Problems/Interventions  Cognition    [ST] Attention(Active)  Current Status(04/14/2021): Pt exhibits mild difficulty with attention to detail  and mild-moderate difficulty with alternating attention.  Weekly Goal(04/22/2021): Will complete functional attention to detail tasks with  NO cues.  Discharge Goal: Improved attention to detail skills for home environment.    [ST] Memory(Active)  Current Status(04/14/2021): Moderate memory deficits  Weekly Goal(04/22/2021): Will recall daily activities with MIN cues.  Discharge Goal: Improved memory for home environment.    Functional Measures  [facility] Eating:  [facility]  [facility] Grooming: [facility]  [facility] Bathing:  [facility] Upper Body Dressing:  [facility] Lower Body Dressing:  [facility] Toileting:  [facility] Bladder Management  Level of Assistance:  [place]  Frequency/Number of Accidents this Shift:  [facility] Bowel Management  Level of Assistance: [place]  Frequency/Number of Accidents this Shift: [facility]    [facility] Bed/Chair/Wheelchair Transfer:  [facility] Toilet Transfer:  [facility] Tub/Shower Transfer:  [place]    Previously Documented Mode of Locomotion at Discharge: Field  [facility] Expected Mode of Locomotion at Discharge: [facility] Walk/Wheelchair:  [facility] Stairs:  [facility]    [facility] Comprehension:  [facility]  [facility] Expression:  [facility]  [facility] Social Interaction:  [facility] Problem Solving:  [facility] Memory:  [place]    Therapy Mode Minutes  Occupational Therapy: Branch  Physical Therapy: [facility]  Speech Language Pathology:  [place]    Signed by: Janice Morocho, SLP

## 2021-04-14 NOTE — PROGRESS NOTES
SECTION GG    Self Care Performance:   Oral Hygiene: [place] sets up or cleans up; patient completes activity. [place]  assists only prior to or following the activity.   Toileting Hygiene: [place] provides verbal cues and/or touching/steadying and/or  contact guard assistance as patient completes activity.   Shower/Bathe Self: [place] provides verbal cues and/or touching/steadying and/or  contact guard assistance as patient completes activity.   Upper Body Dressing: [place] provides verbal cues and/or touching/steadying  and/or contact guard assistance as patient completes activity.   Lower Body Dressing: [place] provides verbal cues and/or touching/steadying  and/or contact guard assistance as patient completes activity.   Putting On/Taking Off Footwear: [place] provides verbal cues and/or  touching/steadying and/or contact guard assistance as patient completes  activity.    Self Care Discharge Goals:   Toileting Hygiene: Patient completed the activities by him/herself with no  assistance from a helper.    Mobility Toilet Transfer Performance: [place] provides verbal cues or  touching/steadying assistance as patient completes activity.    Mobility Toilet Transfer Discharge Goal: Patient completed the activities by  him/herself with no assistance from a helper.    Signed by: JM Torres/MARINA

## 2021-04-14 NOTE — PROGRESS NOTES
Inpatient Rehabilitation Plan of Care Note    Plan of Care  Care Plan Reviewed - No updates at this time.    Psychosocial    Performed Intervention(s)  Verbalizes needs and concerns      Safety    Performed Intervention(s)  Wife staying overnight to monitor pt  Red arm band  Room close to nurses station  Safety rounds  Bed and/or chair alarm      Sphincter Control    Performed Intervention(s)  Monitor intake and output  Encourage fluid intake    Signed by: Shayy Arnold RN

## 2021-04-14 NOTE — THERAPY EVALUATION
Inpatient Rehabilitation - Physical Therapy Initial Evaluation       [facility]     Patient Name: Chad Hansen  : 1950  MRN: [id number]    Today's Date: 2021                    Admit Date: 2021      Visit Dx: No diagnosis found.    Patient Active Problem List   Diagnosis   • Nontraumatic acute cerebral hemorrhage (CMS/HCC)       Past Medical History:   Diagnosis Date   • Arthritis    • Asthma    • Elevated cholesterol    • Hypertension    • Stroke (CMS/HCC)        Past Surgical History:   Procedure Laterality Date   • ABDOMINAL SURGERY     • APPENDECTOMY     • COLONOSCOPY     • EYE SURGERY     • FRACTURE SURGERY     • TONSILLECTOMY            PT ASSESSMENT (last 12 hours)      IRF PT Evaluation and Treatment     Row Name 21 0812          PT Time and Intention    Document Type  initial evaluation  -LB     Mode of Treatment  physical therapy  -LB     Patient/Family/Caregiver Comments/Observations  pt denies pain.  Pt agreeable to therapy  -LB     Row Name 21 0812          General Information    Patient Profile Reviewed  yes  -LB     General Observations of Patient  pt supine in bed, NAD  -LB     Row Name 21 0812          Living Environment    Home Accessibility  stairs to enter home  -LB     Row Name 21 0812          Home Main Entrance    Number of Stairs, Main Entrance  two  -LB     Stair Railings, Main Entrance  none  -LB     Row Name 21 0812          Sensory    Additional Documentation  Sensory Assessment (Somatosensory) (Group)  -LB     Row Name 21 0812          Sensory Assessment (Somatosensory)    Sensory Assessment (Somatosensory)  sensation intact  -LB     Row Name 21 0812          Cognition/Psychosocial    Affect/Mental Status (Cognitive)  confused  -LB     Orientation Status (Cognition)  oriented x 3 Pt did not remember why he came to the hospital  -LB     Follows Commands (Cognition)  follows multi-step commands  -LB     Personal Safety  Interventions  fall prevention program maintained;gait belt;muscle strengthening facilitated;nonskid shoes/slippers when out of bed  -LB     Cognitive Function (Cognitive)  memory deficit  -LB     Memory Deficit (Cognitive)  short-term memory;recall, recent events  -LB     Row Name 04/14/21 0812          Range of Motion (ROM)    Range of Motion  ROM is WNL  -     Row Name 04/14/21 0812          Strength (Manual Muscle Testing)    Strength (Manual Muscle Testing)  strength is WFL  -     Row Name 04/14/21 0812          Mobility    Advanced Gait Activity  rough/uneven surfaces  -LB     Additional Documentation  Advanced Gait Activity (Row)  -     Row Name 04/14/21 0812          Bed Mobility    Bed Mobility  rolling left;rolling right;supine-sit;sit-supine  -LB     Rolling Left [place] (Bed Mobility)  independent  -LB     Rolling Right [place] (Bed Mobility)  independent  -LB     Supine-Sit [place] (Bed Mobility)  independent  -LB     Sit-Supine [place] (Bed Mobility)  independent  -LB     Row Name 04/14/21 0812          Transfer Assessment/Treatment    Transfers  bed-chair transfer;chair-bed transfer;sit-stand transfer;stand-sit transfer;car transfer  -LB     Row Name 04/14/21 0812          Transfers    Bed-Chair [place] (Transfers)  supervision  -LB     Chair-Bed [place] (Transfers)  supervision  -LB     Assistive Device (Bed-Chair Transfers)  wheelchair  -LB     Sit-Stand [place] (Transfers)  supervision;independent  -LB     Stand-Sit [place] (Transfers)  supervision;independent  -LB     Row Name 04/14/21 0812          Chair-Bed Transfer    Assistive Device (Chair-Bed Transfers)  wheelchair  -LB     Row Name 04/14/21 0812          Sit-Stand Transfer    Assistive Device (Sit-Stand Transfers)  wheelchair  -LB     Row Name 04/14/21 0812          Stand-Sit Transfer    Assistive Device (Stand-Sit Transfers)  wheelchair  -LB     Row Name 04/14/21 0812          Car Transfer     Type (Car Transfer)  sit-stand;stand-sit  -LB     [place] Level (Car Transfer)  set up;supervision  -LB     Row Name 04/14/21 0812          Gait/Stairs (Locomotion)    [place] Level (Gait)  standby assist;contact guard  -LB     Distance in Feet (Gait)  160; 100; 50; 500; 300  -LB     Pattern (Gait)  swing-through  -LB     Deviations/Abnormal Patterns (Gait)  -- fast pace; slightly impulsive  -LB     [place] Level (Stairs)  stand by assist;contact guard  -LB     Handrail Location (Stairs)  both sides  -LB     Number of Steps (Stairs)  4, 12  -LB     Ascending Technique (Stairs)  step-over-step  -LB     Descending Technique (Stairs)  step-over-step  -LB     Comment (Gait/Stairs)  no LOB on solid ground or on steps  -LB     Row Name 04/14/21 0812          Rough/Uneven Surface Gait Skills (Mobility)    [place], Gait on Rough/Uneven Surface (Mobility)  standby assist  -LB     Distance in Feet (Rough/Uneven Surface Gait)  10  -LB     Comment, Gait Rough/Uneven Surface (Mobility)  pt picked up object from floor with CGA  -LB     Row Name 04/14/21 0812          Balance    Balance Assessment  sitting static balance;standing static balance;stepping strategy assessment;hip strategy assessment  -LB     Static Sitting Balance  WFL  -LB     Static Standing Balance  WFL  -LB     Balance Interventions  narrowed base of support;step overs;vision occluded activity;sit to stand all performed with SBA  -LB     Row Name 04/14/21 0812          Hip Strategy (Balance)    Hip Strategy Assessment (Balance)  SBA  -LB     Promotion Techniques, Hip Strategy (Balance)  stepping over moderate sized object;narrowed base of support  -LB     Row Name 04/14/21 0812          Motor Skills    Additional Documentation  Advanced Stepping/Walking Interventions (Group)  -LB     Row Name 04/14/21 0812          Advanced Stepping/Walking Interventions    Stepping/Walking Interventions  side stepping;backward walking;box stepping  -LB      Backward Walking (Stepping/Walking Interventions)  SBA  -LB     Box Stepping (Stepping/Walking Interventions)  SBA, also with 4 square step test  -LB     Side Stepping (Stepping/Walking Interventions)  SBA  -LB     Row Name 04/14/21 0812          IRF PT Goals    Transfer Goal Selection (PT-IRF)  transfers, PT goal 1;transfers, PT goal 2  -LB     Gait (Walking Locomotion) Goal Selection (PT-IRF)  gait, PT goal 1  -LB     Stairs Goal Selection (PT-IRF)  stairs, PT goal 1  -LB     Balance Goal Selection (PT)  balance, PT goal (free text)  -LB     Row Name 04/14/21 0812          Transfer Goal 1 (PT-IRF)    Activity/Assistive Device (Transfer Goal 1, PT-IRF)  sit-to-stand/stand-to-sit;bed-to-chair/chair-to-bed  -LB     [place] Level (Transfer Goal 1, PT-IRF)  independent  -LB     Time Frame (Transfer Goal 1, PT-IRF)  long-term goal (LTG);5 days  -LB     Progress/Outcomes (Transfer Goal 1, PT-IRF)  goal ongoing  -LB     Row Name 04/14/21 0812          Transfer Goal 2 (PT-IRF)    Activity/Assistive Device (Transfer Goal 2, PT-IRF)  car transfer  -LB     [place] Level (Transfer Goal 2, PT-IRF)  independent  -LB     Time Frame (Transfer Goal 2, PT-IRF)  long-term goal (LTG);5 days  -LB     Progress/Outcomes (Transfer Goal 2, PT-IRF)  goal ongoing  -LB     Row Name 04/14/21 0812          Gait/Walking Locomotion Goal 1 (PT-IRF)    Gait/Walking Locomotion Distance Goal 1 (PT-IRF)  500  -LB     [place] Level (Gait/Walking Locomotion Goal 1, PT-IRF)  independent  -LB     Time Frame (Gait/Walking Locomotion Goal 1, PT-IRF)  long-term goal (LTG);5 days  -LB     Progress/Outcomes (Gait/Walking Locomotion Goal 1, PT-IRF)  goal ongoing  -LB     Row Name 04/14/21 0812          Stairs Goal 1 (PT-IRF)    Activity/Assistive Device (Stairs Goal 1, PT-IRF)  ascending stairs;descending stairs  -LB     Number of Stairs (Stairs Goal 1, PT-IRF)  12  -LB     [place] Level (Stairs Goal 1, PT-IRF)  set-up required  -LB     Time  Frame (Stairs Goal 1, PT-IRF)  long-term goal (LTG);5 days  -LB     Progress/Outcomes (Stairs Goal 1, PT-IRF)  goal ongoing  -LB     Row Name 04/14/21 0812          Balance Goal (PT)    Balance Goal (PT)  Improve Rodriguez balance score to 53/56  -LB     Time Frame (Balance Goal, PT)  long term goal (LTG);5 days  -LB     Progress/Outcome (Balance Goal, PT)  goal ongoing  -LB     Row Name 04/14/21 0812          Positioning and Restraints    Pre-Treatment Position  in bed  -LB     Post Treatment Position  wheelchair  -LB     In Wheelchair  with SLP  -LB     Row Name 04/14/21 0812          Vital Signs    Pre Systolic BP Rehab  111  -LB     Pre Treatment Diastolic BP  55  -LB     Pretreatment Heart Rate (beats/min)  77  -LB     Row Name 04/14/21 0812          Therapy Assessment/Plan (PT)    Rehab Potential/Prognosis (PT)  good, to achieve stated therapy goals  -LB     Frequency of Treatment (PT)  60 minutes per session;30 minutes per session  -LB     Estimated Duration of Therapy (PT)  1 week  -LB     Problem List (PT)  balance;cognition  -LB     Comment, Therapy Assessment/Plan (PT)  Pt is 72 y/o male admitted to acute rehab following hospitalization for right frontal intraparenchymal hematoma.  Pt is s/p cerebral angiogram which was negative for aneurysm or vascular malformation.  The patient displayed memory deficits as he could not remember why he was admitted to the hospital initially.  Pt has good strength and performed mobility at SBA to CGA level.    -LB       User Key  (r) = Recorded By, (t) = Taken By, (c) = Cosigned By    Initials Name Provider Type    Carrie Trinidad, PT Physical Therapist           Physical Therapy Education                 Title: PT OT SLP Therapies (In Progress)     Topic: Physical Therapy (Done)     Point: Mobility training (Done)     Learning Progress Summary           Patient Acceptance, E,TB, VU,NR by TITO at 4/14/2021 0909                   Point: Precautions (Done)     Learning Progress  Summary           Patient Acceptance, E,TB, VU,NR by LB at 4/14/2021 0909                               User Key     Initials Effective Dates Name Provider Type Discipline     04/03/18 -  Carrie Israel, PT Physical Therapist PT                PT Recommendation and Plan    Planned Therapy Interventions (PT): balance training, gait training, strengthening, transfer training, stair training  Frequency of Treatment (PT): 60 minutes per session, 30 minutes per session            Outcome Measures     Row Name 04/14/21 1400             Rodriguez Balance Scale    Sitting to Standing  4  -LB      Standing Unsupported  4  -LB      Sitting with Back Unsupported but Feet Supported on Floor or on Stool  4  -LB      Standing to Sitting  4  -LB      Transfers  4  -LB      Standing Unsupported with Eyes Closed  4  -LB      Standing Unsupported with Feet Together  4  -LB      Reaching Forward with Outstretched Arm While Standing  4  -LB       Object From the Floor From a Standing Position  3  -LB      Turning to Look Behind Over Left and Right Shoulders While Standing  3  -LB      Turn 360 Degrees  4  -LB      Place Alternate Foot on Step or Stool While Standing Unsupported  4  -LB      Standing Unsupported with One Foot in Front  3  -LB      Standing on One Leg  3  -LB      Rodriguez Total Score  52  -LB      Rodriguez Comments  52/56- low risk for falls  -LB         Functional Assessment    Outcome Measure Options  Rodriguez Balance  -LB        User Key  (r) = Recorded By, (t) = Taken By, (c) = Cosigned By    Initials Name Provider Type    LB Carrie Israel, PT Physical Therapist               Time Calculation:     PT Charges     Row Name 04/14/21 1431 04/14/21 0908          Time Calculation    Start Time  1300  -LB  0800  -LB     Stop Time  1330  -LB  0830  -LB     Time Calculation (min)  30 min  -LB  30 min  -LB     PT Received On  04/14/21  -LB  04/14/21  -LB     PT - Next Appointment  04/15/21  -LB  04/14/21  -LB     PT Goal Re-Cert Due  Date  --  04/19/21  -TITO       User Key  (r) = Recorded By, (t) = Taken By, (c) = Cosigned By    Initials Name Provider Type    Carrie Trinidad, PT Physical Therapist          Therapy Charges for Today     Code Description Service Date Service Provider Modifiers Qty    [id number]  PT EVAL LOW COMPLEXITY 2 4/14/2021 Carrie Israel, PT GP 1    [id number]  PT NEUROMUSC RE EDUCATION EA 15 MIN 4/14/2021 Carrie Israel, PT GP 3        Patient was wearing a face mask during this therapy encounter. Therapist used appropriate personal protective equipment including eye protection, mask, and gloves.  Mask used was standard procedure mask. Appropriate PPE was worn during the entire therapy session. Hand hygiene was completed before and after therapy session. Patient is not in enhanced droplet precautions.         PT G-Codes  Outcome Measure Options: Rodriguez Balance  Rodriguez Total Score: 52      Carrie Israel PT  4/14/2021

## 2021-04-14 NOTE — PROGRESS NOTES
Physical Medicine and Rehabilitation  Inpatient Rehabilitation Interdisciplinary Plan of Care    Demographics            Age: 71Y            Gender: Male    Admission Date: 4/13/2021 5:24:00 PM  Rehabilitation Diagnosis:  IPH  Status post right frontoparietal intraparenchymal hemorrhage-4.5 x 1.7 cm-April 5, 2021    Impaired cognition/impaired mobility/impaired self-care    hypertension-April 14-blood pressure elevated 125//50.  Has been on  lisinopril 40 mg daily and metoprolol 100 mg twice a day.  On hydralazine 10 mg  every 6 hours as needed, systolic blood pressure greater than 160, will increase  that to 20 mg as needed dose.  Add amlodipine 5 mg q. evening.    Emphysema-bronchodilator inhaler    Impaired cognition/mobility/self-care    Sundowning-Seroquel low-dose at night    DVT prophylaxis-SCDs    Transaminitis-April 14-AST/ALT increased compared to April 5.  Etiology unclear  as no intervening labs.  Present medications reviewed.  Recheck later this week.    Plan of Care  Anticipated Discharge Date/Estimated Length of Stay: ELOS: 10 days  Anticipated Discharge Destination: Community discharge with assistance  Discharge Plan : Pt plans to return home with his wife where he lives in a  single story ranch/w basement with 2 steps to enter home.  Medical Necessity Expected Level Rationale: Goals are to achieve a level of  supervision with  mobility and self-care and improved balance.   Rehabilitation  prognosis fair.  Medical prognosis fair.  Intensity and Duration: an average of 3 hours/5 days per week  Medical Supervision and 24 Hour Rehab Nursing: x  Physical Therapy: x  PT Intensity/Duration: 1 hour/day, 5 days/week for approximately 8 days  Occupational Therapy: x  OT Intensity/Duration: 1 hour/day, 5 days/week for approximately 8 days  Speech and Language Therapy: x  SLP Intensity/Duration: 1 hour/day, 5 days/week for approximately 8 days  Social Work: x  Therapeutic Recreation: x  Psychology:  x  Updated (if changes indicated)  No changes to plan.    Based on the patient's medical and functional status, their prognosis and  expected level of functional improvement is: Goals are to achieve a level of  supervision with  mobility and self-care and improved balance.   Rehabilitation  prognosis fair.  Medical prognosis fair.    Interdisciplinary Problem/Goals/Status  Cognition    [ST] Attention(Active)  Current Status(04/14/2021): Pt exhibits mild difficulty with attention to detail  and mild-moderate difficulty with alternating attention.  Weekly Goal(04/22/2021): Will complete functional attention to detail tasks with  NO cues.  Discharge Goal: Improved attention to detail skills for home environment.    [ST] Memory(Active)  Current Status(04/14/2021): Moderate memory deficits  Weekly Goal(04/22/2021): Will recall daily activities with MIN cues.  Discharge Goal: Improved memory for home environment.        Mobility    [PT] Bed/Chair/Wheelchair(Active)  Current Status(04/14/2021): SBA/Supervision  Weekly Goal(04/17/2021): Indep  Discharge Goal: Indep    [PT] Walk(Active)  Current Status(04/14/2021): 400 ft SBA- CGA  Weekly Goal(04/17/2021): on unit with supervision  Discharge Goal: 500 ft Supervision    [PT] Stairs(Active)  Current Status(04/14/2021): 12 HRs SBA/CGA  Weekly Goal(04/17/2021): PT only  Discharge Goal: 12 HR supervision    [OT] Toilet Transfers(Active)  Current Status(04/14/2021): CGA  Weekly Goal(04/21/2021): SUP  Discharge Goal: MOD I    [OT] Tub/Shower Transfers(Active)  Current Status(04/14/2021): CGA  Weekly Goal(04/21/2021): SUP  Discharge Goal: MOD I        Psychosocial    [RN] Coping/Adjustment(Active)  Current Status(04/13/2021): Expresses appropriate coping  Weekly Goal(04/20/2021): Allow opportunity to express concerns regarding current  status  Discharge Goal: Demonstrate healthy coping strategies        Safety    [RN] Potential for Injury(Active)  Current Status(04/13/2021): Pt is  forgetful/impulsive  Weekly Goal(04/20/2021): Free from falls  Discharge Goal: Pt/family will be aware of risk of falls and safety in the home        Self Care    [OT] Bathing(Active)  Current Status(04/14/2021): CGA  Weekly Goal(04/21/2021): SUP  Discharge Goal: MOD I    [OT] Dressing (Lower)(Active)  Current Status(04/14/2021): CGA  Weekly Goal(04/21/2021): SUP  Discharge Goal: MOD I    [OT] Dressing (Upper)(Active)  Current Status(04/14/2021): SETUP  Weekly Goal(04/21/2021): MOD I  Discharge Goal: MOD I    [OT] Grooming(Active)  Current Status(04/14/2021): SUP  Weekly Goal(04/21/2021): MOD I  Discharge Goal: MOD I    [OT] Toileting(Active)  Current Status(04/14/2021): CGA  Weekly Goal(04/21/2021): SUP  Discharge Goal: MOD I        Sphincter Control    [RN] Bladder Management(Active)  Current Status(04/13/2021): Continent 100%  Weekly Goal(04/20/2021): Continent 100%  Discharge Goal: Continent 100%    [RN] Bowel Management(Active)  Current Status(04/13/2021): Continent 100%  Weekly Goal(04/20/2021): Continent 100%  Discharge Goal: Continent 100%      Comments:    Signed by: Nomi Ibarra MD

## 2021-04-14 NOTE — PROGRESS NOTES
Inpatient Rehabilitation Plan of Care Note    Plan of Care  Updated Problems/Interventions  Mobility    [OT] Toilet Transfers(Active)  Current Status(04/14/2021): CGA  Weekly Goal(04/21/2021): SUP  Discharge Goal: MOD I    [OT] Tub/Shower Transfers(Active)  Current Status(04/14/2021): CGA  Weekly Goal(04/21/2021): SUP  Discharge Goal: MOD I        Self Care    [OT] Bathing(Active)  Current Status(04/14/2021): CGA  Weekly Goal(04/21/2021): SUP  Discharge Goal: MOD I    [OT] Dressing (Lower)(Active)  Current Status(04/14/2021): CGA  Weekly Goal(04/21/2021): SUP  Discharge Goal: MOD I    [OT] Dressing (Upper)(Active)  Current Status(04/14/2021): SETUP  Weekly Goal(04/21/2021): MOD I  Discharge Goal: MOD I    [OT] Grooming(Active)  Current Status(04/14/2021): SUP  Weekly Goal(04/21/2021): MOD I  Discharge Goal: MOD I    [OT] Toileting(Active)  Current Status(04/14/2021): CGA  Weekly Goal(04/21/2021): SUP  Discharge Goal: MOD I    Signed by: JM Torres/L

## 2021-04-14 NOTE — PROGRESS NOTES
CHP finds pt reclining in bed alone in rm.  Alert, conversant but appears somewhat shy in interaction.  States he is feeling very well and looks forward to d/c soon, looks forward to being at home again.  No requests other than prayer for healing and return home.  Aware of [facility] availability 24/7.

## 2021-04-14 NOTE — PROGRESS NOTES
Continued Stay Note  [facility]     Patient Name: Chad Hansen  MRN: [id number]  Today's Date: 4/14/2021    Admit Date: 4/13/2021    Discharge Plan     Row Name 04/14/21 1012       Plan    Plan  Home with wife and on going assessment for after care needs    Plan Comments  Pt's assessment completed.  Spoke with pt at bedside and call placed to pt's wife Whit [phone]  who confirm pt lives at home with her in a single story ranch style home with a basement and 2 steps to enter.  Pt reports being totally independent PTA and denies using any DME at home. Pt and his wife are both retired and per pt's wife she is available to assist pt as needed at DC 24/7. Discussed with pt and his wife SW's role on rehab. Informed them both about weekly treatment team conferences as weel as family conference prior to pt being DC.  Both voiced understanding. SW'er will follow pt's progress for ongoing assessment for DCP/needs.        Discharge Codes    No documentation.             MARYJANE Rothman

## 2021-04-14 NOTE — PROGRESS NOTES
SECTION GG    Mobility Performance:     Roll Left and Right: Patient completed the activities by him/herself with no  assistance from a helper.   Sit to Lying: Patient completed the activities by him/herself with no  assistance from a helper.   Lying to Sitting on Side of Bed: Patient completed the activities by  him/herself with no assistance from a helper.   Sit to Stand: Patient completed the activities by him/herself with no  assistance from a helper.   Chair/Bed to Chair Transfer: [place] provides verbal cues and/or  touching/steadying and/or contact guard assistance as patient completes  activity. Assistance may be provided throughout the activity or intermittently.   Car Transfer: [place] provides verbal cues and/or touching/steadying and/or  contact guard assistance as patient completes activity. Assistance may be  provided throughout the activity or intermittently.   Walk 10 Feet:   Patient completed the activities by him/herself with no  assistance from a helper.  Walk 50 Feet with 2 Turns:   [place] provides verbal cues and/or  touching/steadying and/or contact guard assistance as patient completes  activity. Assistance may be provided throughout the activity or intermittently.  Walk 150 Feet:   [place] provides verbal cues and/or touching/steadying and/or  contact guard assistance as patient completes activity. Assistance may be  provided throughout the activity or intermittently.  Walking 10 Feet on Uneven Surfaces:   [place] provides verbal cues and/or  touching/steadying and/or contact guard assistance as patient completes  activity. Assistance may be provided throughout the activity or intermittently.  1 Step Over Curb or Up/Down Stair:   [place] provides verbal cues and/or  touching/steadying and/or contact guard assistance as patient completes  activity. Assistance may be provided throughout the activity or intermittently.  4 Steps Up and Down, With/Without Rail:   [place] provides verbal cues  and/or  touching/steadying and/or contact guard assistance as patient completes  activity. Assistance may be provided throughout the activity or intermittently.  12 Steps Up and Down, With/Without Rail:   [place] provides verbal cues and/or  touching/steadying and/or contact guard assistance as patient completes  activity. Assistance may be provided throughout the activity or intermittently.  Picking up an Object:   [place] provides verbal cues and/or touching/steadying  and/or contact guard assistance as patient completes activity. Assistance may be  provided throughout the activity or intermittently.  Uses Wheelchair/Scooter: No    Mobility Discharge Goals:   Chair/Bed to Chair Transfer: Patient completed the activities by him/herself  with no assistance from a helper.    Signed by: Carrie Israel PT

## 2021-04-14 NOTE — PLAN OF CARE
Goal Outcome Evaluation:     Progress: improving  Outcome Summary: Cognitive-Linguistic Quick Test (CLQT) administered. Pt scored an overall composite severity rating of 2.8 out of 4, indicating a mild cognitive-linguistic impairment for his age. He scored mildly impaired in the domains of attention, executive functions, language and visuospatial skills and moderately impaired in the domain of memory. He exhibited difficulty with story retelling, alternating attention for symbol trails, symbol cancellation, generative naming, design memory and design generation.

## 2021-04-14 NOTE — CONSULTS
"Adult Nutrition  Assessment/PES    Patient Name:  Chad Hansen  YOB: 1950  MRN: [id number]  Admit Date:  4/13/2021    Assessment Date:  4/14/2021    Comments:  Rehab admission assessment    Transfer from [facility] following nontraumatic cerebral hemorrhage. On reg/thins (cardiac) diet, intake % thus far. It is noted he received boost supplements at [facility] - he is eating fine here, doesn't think he needs right now. Skin intact, labs reviewed. Alternative menu given & reviewed with pt. Will cont to follow.     Reason for Assessment     Row Name 04/14/21 1112          Reason for Assessment    Reason For Assessment  nurse/nurse practitioner consult     Diagnosis  neurologic conditions;pulmonary disease Nontraumatic acute cerebral hemorrhage     Identified At Risk by Screening Criteria  no indicators present         Nutrition/Diet History     Row Name 04/14/21 1113          Nutrition/Diet History    Typical Food/Fluid Intake  Eating well here, %.     Supplemental Drinks/Foods/Additives  had boost ordered at [facility] per admission notes         Anthropometrics     Row Name 04/14/21 1114          Anthropometrics    Height  188 cm (74\")        Admit Weight    Admit Weight  73.5 kg (162 lb)        Ideal Body Weight (IBW)    Ideal Body Weight (IBW) (kg)  87.66     % of Ideal Body Weight Assessment  80-90%: mild deficit        Body Mass Index (BMI)    BMI Assessment  BMI 18.5-24.9: normal         Labs/Tests/Procedures/Meds     Row Name 04/14/21 1115          Labs/Procedures/Meds    Lab Results Reviewed  reviewed, pertinent     Lab Results Comments  Aic 5.3%, WBC 11k, h/h        Diagnostic Tests/Procedures    Diagnostic Test/Procedure Reviewed  reviewed, pertinent     Diagnostic Test/Procedures Comments  Status post right frontoparietal intraparenchymal hemorrhage-4.5 x 1.7 cm-April 5, 2021        Medications    Pertinent Medications Reviewed  reviewed         Physical Findings     Row Name " "04/14/21 1116          Physical Findings    Skin  other (see comments) Malvin 22, skin intact         Estimated/Assessed Needs     Row Name 04/14/21 1114          Calculation Measurements    Height  188 cm (74\")         Nutrition Prescription Ordered     Row Name 04/14/21 1118          Nutrition Prescription PO    Current PO Diet  Regular     Fluid Consistency  Thin     Common Modifiers  Cardiac         Evaluation of Received Nutrient/Fluid Intake     Row Name 04/14/21 1118          PO Evaluation    Number of Meals  2     % PO Intake  %               Problem/Interventions:  Problem 1     Row Name 04/14/21 1119          Nutrition Diagnoses Problem 1    Problem 1  Nutrition Appropriate for Condition at this Time     Etiology (related to)  MNT for Treatment/Condition     Signs/Symptoms (evidenced by)  PO Intake               Intervention Goal     Row Name 04/14/21 1119          Intervention Goal    General  Maintain nutrition     PO  Maintain intake;PO intake (%)     PO Intake %  75 %     Weight  Maintain weight         Nutrition Intervention     Row Name 04/14/21 1119          Nutrition Intervention    RD/Tech Action  Follow Tx progress;Care plan reviewd;Encourage intake;Menu provided;Advise alternate selection           Education/Evaluation     Row Name 04/14/21 1119          Education    Education  Will Instruct as appropriate        Monitor/Evaluation    Monitor  Per protocol           Electronically signed by:  Gwen Connell RD  04/14/21 11:19 EDT  "

## 2021-04-14 NOTE — PROGRESS NOTES
Case Management  Inpatient Rehabilitation Plan of Care and Discharge Plan Note    Rehabilitation Diagnosis:  Branch  Date of Onset:  Melly    Medical Summary:  Melly  Past Medical History: Melly    Plan of Care  Updated Problems/Interventions  Field    Expected Intensity:  Branch  Interdisciplinary Team:  Melly  Estimated Length of Stay/Anticipated Discharge Date: Branch  Anticipated Discharge Destination:  Anticipated discharge destination from inpatient rehabilitation is community  discharge with assistance. Pt plans to return home with his wife where he lives  in a single story ranch/w basement with 2 steps to enter home.      Based on the patient's medical and functional status, their prognosis and  expected level of functional improvement is:  Melly    Signed by: ARI Covarrubias

## 2021-04-14 NOTE — THERAPY EVALUATION
Inpatient Rehabilitation - Occupational Therapy Initial Evaluation    [facility]     Patient Name: Chad Hansen  : 1950  MRN: [id number]    Today's Date: 2021                 Admit Date: 2021       No diagnosis found.    Patient Active Problem List   Diagnosis   • Nontraumatic acute cerebral hemorrhage (CMS/HCC)       Past Medical History:   Diagnosis Date   • Arthritis    • Asthma    • Elevated cholesterol    • Hypertension    • Stroke (CMS/HCC)        Past Surgical History:   Procedure Laterality Date   • ABDOMINAL SURGERY     • APPENDECTOMY     • COLONOSCOPY     • EYE SURGERY     • FRACTURE SURGERY     • TONSILLECTOMY                  IRF OT ASSESSMENT FLOWSHEET (last 12 hours)      IRF OT Evaluation and Treatment     Row Name 21 1203          OT Time and Intention    Document Type  initial evaluation  -SG     Mode of Treatment  individual therapy;occupational therapy  -SG     Patient Effort  good  -SG     Symptoms Noted During/After Treatment  none  -SG     Row Name 21 1203          General Information    Patient Profile Reviewed  yes  -SG     Patient/Family/Caregiver Comments/Observations  Pt supine in bed, frustrated about alarms in room  -SG     Limitations/Impairments  safety/cognitive  -SG     Row Name 21 1203          Previous Level of Function/Home Environm    Activity/Exercise/Self-Care Comment  Indep PTA  -SG     Row Name 21 1203          Cognition/Psychosocial    Affect/Mental Status (Cognitive)  confused  -SG     Orientation Status (Cognition)  oriented x 3  -SG     Follows Commands (Cognition)  follows multi-step commands  -SG     Personal Safety Interventions  gait belt;fall prevention program maintained  -SG     Cognitive Function (Cognitive)  memory deficit  -SG     Comment, Cognitive Interventions  Slightly impulsive, vcing for pacing and safety   -SG     Row Name 21 1203          Range of Motion Comprehensive    Comment, General Range of Motion   BUE WFL  -     Row Name 04/14/21 1203          Hand  Strength Testing    Left Hand, Setting 2 (Dynamometer Testing)  85  -     Right Hand, Setting 2 (Dynamometer Testing)  77  -     Left Hand: Tip (Pincer) Pinch Strength (Pinch Dynamometer Testing)  13  -SG     Left Hand: Lateral (Key) Pinch Strength (Pinch Dynamometer Testing)  16  -     Right Hand: Tip (Pincer) Pinch Strength (Pinch Dynamometer Testing)  13  -SG     Right Hand: Lateral (Key) Pinch Strength (Pinch Dynamometer Testing)  20  -     Row Name 04/14/21 1203          Strength Comprehensive (MMT)    General Manual Muscle Testing (MMT) Assessment  hand strength deficits identified  -     Comment, General Manual Muscle Testing (MMT) Assessment  BUE 4/5  -     Hand Strength Assessment  hand  strength  -[facility] Name 04/14/21 1203          Bed Mobility    Supine-Sit Hayes (Bed Mobility)  independent  -     Sit-Supine [place] (Bed Mobility)  not tested  -[facility] Name 04/14/21 1203          Functional Mobility    Functional Mobility- Ind. Level  standby assist;contact guard assist  -     Functional Mobility- Comment  Walks to shower room, back to room, walks down to gym and back, no LOB. Vcs for slowing down  -     Row Name 04/14/21 1203          Transfer Assessment/Treatment    Transfers  sit-stand transfer;stand-sit transfer;toilet transfer;shower transfer  -[facility] Name 04/14/21 1203          Transfers    Sit-Stand Hayes (Transfers)  supervision  -     Stand-Sit [place] (Transfers)  supervision  -     [place] Level (Toilet Transfer)  supervision  -     [place] Level (Shower Transfer)  contact guard Did have small LOB posteriorly in shower, self corrects  -     Row Name 04/14/21 1203          Toilet Transfer    Type (Toilet Transfer)  stand pivot/stand step  -[facility] Name 04/14/21 1203          Shower Transfer    Type (Shower Transfer)  stand pivot/stand step  -[facility] Name  04/14/21 1203          Motor Skills    Motor Skills  coordination  -     Coordination  9 Hole Peg Test of Fine Motor Coordination Results  -     Results, 9 Hole Peg Test of Fine Motor Coordination  BOX AND BLOCKS R53, L55, 9 HOLE PEG R29, L28  -     Row Name 04/14/21 1203          Basic Activities of Daily Living (BADLs)    Basic Activities of Daily Living  bathing;upper body dressing;lower body dressing;grooming;toileting;self-feeding  -     Row Name 04/14/21 1203          Bathing    [place] Level (Bathing)  bathing skills;standby assist  -     Assistive Device (Bathing)  grab bar/tub rail;hand held shower spray hose;shower chair  -     Position (Bathing)  unsupported sitting;supported standing  -     Comment (Bathing)  LOB while standing for LBB, hand slipped on grab bar  -     Row Name 04/14/21 1203          Upper Body Dressing    [place] Level (Upper Body Dressing)  upper body dressing skills;set up assistance  -     Position (Upper Body Dressing)  supported sitting  -     Comment (Upper Body Dressing)  Cues to stay seated  -     Row Name 04/14/21 1203          Lower Body Dressing    [place] Level (Lower Body Dressing)  doff;don;pants/bottoms;shoes/slippers;socks;underwear;standby assist  -     Position (Lower Body Dressing)  supported sitting;supported standing  -     Set-up Assistance (Lower Body Dressing)  obtain clothing  -     Row Name 04/14/21 1203          Grooming    [place] Level (Grooming)  grooming skills;deodorant application;hair care, combing/brushing;oral care regimen;wash face, hands;standby assist  -     Position (Grooming)  sink side;supported standing  -     Set-up Assistance (Grooming)  obtain supplies  -     Row Name 04/14/21 1203          Toileting    [place] Level (Toileting)  toileting skills;adjust/manage clothing;perform perineal hygiene;supervision  -     Assistive Device Use (Toileting)  grab bar/safety frame  -      Position (Toileting)  unsupported standing  -SG     Row Name 04/14/21 1203          IRF OT Goals    Transfer Goal Selection (OT-IRF)  transfers, OT goal 1;transfers, OT goal 2  -SG     Bathing Goal Selection (OT-IRF)  bathing, OT goal 1  -SG     Toileting Goal Selection (OT-IRF)  toileting, OT goal 1  -SG     Balance Goal Selection (OT)  balance, OT goal 1  -SG     Caregiver Training Goal Selection (OT-IRF)  caregiver training, OT goal 1  -SG     Safety Awareness Goal Selection (OT-IRF)  safety awareness, OT goal 1  -SG     Row Name 04/14/21 1203          Transfer Goal 1 (OT-IRF)    Activity/Assistive Device (Transfer Goal 1, OT-IRF)  toilet  -SG     [place] Level (Transfer Goal 1, OT-IRF)  independent  -SG     Time Frame (Transfer Goal 1, OT-IRF)  long-term goal (LTG);by discharge  -SG     Progress/Outcomes (Transfer Goal 1, OT-IRF)  goal ongoing  -SG     Row Name 04/14/21 1203          Transfer Goal 2 (OT-IRF)    Activity/Assistive Device (Transfer Goal 2, OT-IRF)  walk-in shower  -SG     [place] Level (Transfer Goal 2, OT-IRF)  independent  -SG     Time Frame (Transfer Goal 2, OT-IRF)  long-term goal (LTG);by discharge  -SG     Progress/Outcomes (Transfer Goal 2, OT-IRF)  goal ongoing  -SG     Row Name 04/14/21 1203          Bathing Goal 1 (OT-IRF)    Activity/Device (Bathing Goal 1, OT-IRF)  bathing skills, all  -SG     [place] Level (Bathing Goal 1, OT-IRF)  independent  -SG     Time Frame (Bathing Goal 1, OT-IRF)  long-term goal (LTG);by discharge  -SG     Progress/Outcomes (Bathing Goal 1, OT-IRF)  goal ongoing  -SG     Row Name 04/14/21 1203          Toileting Goal 1 (OT-IRF)    Activity/Device (Toileting Goal 1, OT-IRF)  toileting skills, all  -SG     [place] Level (Toileting Goal 1, OT-IRF)  independent  -SG     Progress/Outcomes (Toileting Goal 1, OT-IRF)  goal ongoing  -SG     Time Frame (Toileting Goal 1, OT-IRF)  long-term goal (LTG);by discharge  -SG     Row Name 04/14/21 120           Balance Goal 1 (OT)    Activity/Assistive Device (Balance Goal 1, OT)  standing, static;standing, dynamic  -SG     [place] Level/Cues Needed (Balance Goal 1, OT)  independent  -SG     Time Frame (Balance Goal 1, OT)  long term goal (LTG);by discharge  -SG     Progress/Outcomes (Balance Goal 1, OT)  goal ongoing  -     Row Name 04/14/21 1203          Caregiver Training Goal 1 (OT-IRF)    Caregiver Training Goal 1 (OT-IRF)  Pt and family to be indep with ADLs, functional transfers and AD/AE needed for safe d/c home.  -SG     Time Frame (Caregiver Training Goal 1, OT-IRF)  long-term goal (LTG);by discharge  -SG     Progress/Outcomes (Caregiver Training Goal 1, OT-IRF)  goal ongoing  -SG     Row Name 04/14/21 1203          Safety Awareness Goal 1 (OT-IRF)    Activity (Safety Awareness Goal 1, OT-IRF)  insight into deficits/self-awareness;judgment  -SG     [place]/Cues/Accuracy (Safety Awareness Goal 1, OT-IRF)  independent  -SG     Time Frame (Safety Awareness Goal 1, OT-IRF)  long-term goal (LTG);by discharge  -SG     Progress/Outcomes (Safety Awareness Goal 1, OT-IRF)  goal ongoing  -     Row Name 04/14/21 1203          Positioning and Restraints    Pre-Treatment Position  in bed  -SG     Post Treatment Position  chair  -SG     In Chair  notified nsg;sitting;call light within reach;encouraged to call for assist;exit alarm on  -SG     Row Name 04/14/21 1203          Therapy Assessment/Plan (OT)    OT Diagnosis  Pt is seen today for OT eval following CVA with aphasia and cognitive deficits. Pt this date requires CGA/SBA for ADLs and demo some impulsivity and decreased insight to safety deficits. Pt would benefit from OT to address deficits and return to independence.  -SG     Rehab Potential/Prognosis (OT)  good, to achieve stated therapy goals  -SG     Frequency of Treatment (OT)  60 minutes per session  -SG     Estimated Duration of Therapy (OT)  1 week  -SG     Problem List (OT)  balance;cognition   -     Row Name 04/14/21 1203          Therapy Plan Review/Discharge Plan (OT)    Therapy Plan Review (OT)  evaluation/treatment results reviewed;care plan/treatment goals reviewed;participants voiced agreement with care plan;patient  -SG     Expected Discharge Disposition (OT)  home  -       User Key  (r) = Recorded By, (t) = Taken By, (c) = Cosigned By    Initials Name Effective Dates    Shanti Grossman OTR 12/26/18 -            Occupational Therapy Education                 Title: PT OT SLP Therapies (In Progress)     Topic: Occupational Therapy (In Progress)     Point: ADL training (Done)     Description:   Instruct learner(s) on proper safety adaptation and remediation techniques during self care or transfers.   Instruct in proper use of assistive devices.              Learning Progress Summary           Patient Acceptance, E,TB, VU by  at 4/14/2021 1227    Comment: OT role and goals, safety concerns                   Point: Home exercise program (Not Started)     Description:   Instruct learner(s) on appropriate technique for monitoring, assisting and/or progressing therapeutic exercises/activities.              Learner Progress:  Not documented in this visit.          Point: Precautions (Not Started)     Description:   Instruct learner(s) on prescribed precautions during self-care and functional transfers.              Learner Progress:  Not documented in this visit.          Point: Body mechanics (Not Started)     Description:   Instruct learner(s) on proper positioning and spine alignment during self-care, functional mobility activities and/or exercises.              Learner Progress:  Not documented in this visit.                      User Key     Initials Effective Dates Name Provider Type Discipline     12/26/18 -  Shanti Morales OTR Occupational Therapist OT                    OT Recommendation and Plan                         Time Calculation:     Time Calculation- OT     Row Name  04/14/21 1227             Time Calculation- OT    OT Start Time  1030  -SG      OT Stop Time  1130  -SG      OT Time Calculation (min)  60 min  -      OT Received On  04/14/21  -      OT Goal Re-Cert Due Date  04/21/21  -        User Key  (r) = Recorded By, (t) = Taken By, (c) = Cosigned By    Initials Name Provider Type    Shanti Grossman OTR Occupational Therapist        Therapy Charges for Today     Code Description Service Date Service Provider Modifiers Qty    03806639827 HC OT SELF CARE/MGMT/TRAIN EA 15 MIN 4/14/2021 Shanti Morales OTR GO 3    43818710938 HC OT EVAL MOD COMPLEXITY 2 4/14/2021 Shanti Morales OTR GO 1                   JM Torres  4/14/2021

## 2021-04-14 NOTE — PLAN OF CARE
Goal Outcome Evaluation:  Plan of Care Reviewed With: patient  Progress: improving  Outcome Summary: A&OX4. Forgetful. Calm, cooperative, and pleasant. Ate well at meals. Diet: Reg, healthy heart, thin liquids. Denied pain. Full code. Participated fully in therapy. Has OT/PT/Speech. NIH=0. Can walk on own well. No numbness or tingling. Continent B&B. Last BM 4/13. Did not sleep well even with the seroquel due to urinary urgency and frequency. Is using the urinal now. HAs PRN hydralazine. Keep BP between 140s-160s.

## 2021-04-15 LAB — QT INTERVAL: 419 MS

## 2021-04-15 PROCEDURE — 93010 ELECTROCARDIOGRAM REPORT: CPT | Performed by: INTERNAL MEDICINE

## 2021-04-15 PROCEDURE — 97129 THER IVNTJ 1ST 15 MIN: CPT

## 2021-04-15 PROCEDURE — 97535 SELF CARE MNGMENT TRAINING: CPT

## 2021-04-15 PROCEDURE — 97530 THERAPEUTIC ACTIVITIES: CPT

## 2021-04-15 PROCEDURE — 93005 ELECTROCARDIOGRAM TRACING: CPT | Performed by: HOSPITALIST

## 2021-04-15 PROCEDURE — 94799 UNLISTED PULMONARY SVC/PX: CPT

## 2021-04-15 PROCEDURE — 97535 SELF CARE MNGMENT TRAINING: CPT | Performed by: OCCUPATIONAL THERAPIST

## 2021-04-15 PROCEDURE — 97130 THER IVNTJ EA ADDL 15 MIN: CPT

## 2021-04-15 RX ORDER — PREDNISONE 10 MG/1
10 TABLET ORAL DAILY
Status: ON HOLD | COMMUNITY
End: 2021-04-16

## 2021-04-15 RX ORDER — ACETAMINOPHEN 325 MG/1
650 TABLET ORAL EVERY 6 HOURS PRN
Status: DISCONTINUED | OUTPATIENT
Start: 2021-04-15 | End: 2021-04-17 | Stop reason: HOSPADM

## 2021-04-15 RX ORDER — CLOBETASOL PROPIONATE 0.5 MG/G
1 CREAM TOPICAL DAILY
COMMUNITY

## 2021-04-15 RX ORDER — HYDROCHLOROTHIAZIDE 12.5 MG/1
12.5 TABLET ORAL DAILY
COMMUNITY
End: 2021-04-17 | Stop reason: HOSPADM

## 2021-04-15 RX ORDER — NICOTINE 21 MG/24HR
1 PATCH, TRANSDERMAL 24 HOURS TRANSDERMAL EVERY 24 HOURS
Status: ON HOLD | COMMUNITY
End: 2021-04-16

## 2021-04-15 RX ORDER — ALBUTEROL SULFATE 90 UG/1
2 AEROSOL, METERED RESPIRATORY (INHALATION) EVERY 6 HOURS PRN
Status: ON HOLD | COMMUNITY
End: 2021-04-16 | Stop reason: SDUPTHER

## 2021-04-15 RX ORDER — FLUTICASONE PROPIONATE 50 MCG
1 SPRAY, SUSPENSION (ML) NASAL DAILY
COMMUNITY

## 2021-04-15 RX ADMIN — ACETAMINOPHEN 650 MG: 325 TABLET, FILM COATED ORAL at 15:15

## 2021-04-15 RX ADMIN — BUDESONIDE AND FORMOTEROL FUMARATE DIHYDRATE 2 PUFF: 160; 4.5 AEROSOL RESPIRATORY (INHALATION) at 08:03

## 2021-04-15 RX ADMIN — METOPROLOL TARTRATE 100 MG: 50 TABLET, FILM COATED ORAL at 08:02

## 2021-04-15 RX ADMIN — FLUTICASONE PROPIONATE 1 SPRAY: 50 SPRAY, METERED NASAL at 08:02

## 2021-04-15 RX ADMIN — ACETAMINOPHEN 650 MG: 325 TABLET, FILM COATED ORAL at 08:04

## 2021-04-15 RX ADMIN — QUETIAPINE FUMARATE 12.5 MG: 25 TABLET ORAL at 20:51

## 2021-04-15 RX ADMIN — AMLODIPINE BESYLATE 5 MG: 5 TABLET ORAL at 17:15

## 2021-04-15 RX ADMIN — BUDESONIDE AND FORMOTEROL FUMARATE DIHYDRATE 2 PUFF: 160; 4.5 AEROSOL RESPIRATORY (INHALATION) at 20:44

## 2021-04-15 RX ADMIN — METOPROLOL TARTRATE 100 MG: 50 TABLET, FILM COATED ORAL at 20:48

## 2021-04-15 RX ADMIN — LISINOPRIL 40 MG: 40 TABLET ORAL at 08:02

## 2021-04-15 RX ADMIN — HYDRALAZINE HYDROCHLORIDE 20 MG: 10 TABLET, FILM COATED ORAL at 04:48

## 2021-04-15 NOTE — PLAN OF CARE
Goal Outcome Evaluation:        Outcome Summary: Mr Hansen is alert and oriented x4, but forgetful and very impulsive at times. He is continent of b/b, takes medication with water, and assist x1, but very quick and often uses BR on his own. He had tylenol once for a headache with good results, NIH discontinued, and probable discharge on Saterday. He has a family confernce tomorrow, rash to back, and PRN hydralazine available, but not needed on my shift.

## 2021-04-15 NOTE — CONSULTS
Patient Name:  Chad Hansen  YOB: 1950  MRN:  [id number]  Date of Admission:  4/13/2021  Date of Consult:  4/15/2021  Patient Care Team:  Marianna Billings APRN as PCP - General (Nurse Practitioner)    Inpatient Internal Medicine Consult  Consult performed by: Justen Correa MD  Consult ordered by: Nomi Ibarra MD  Reason for consult: Evaluate status and make recommendations regarding treatment for hypertension following ICH        Subjective   History of Present Illness  Mr. Hansen is a 71 y.o. male that has been admitted to [facility] due to recent ICH.  He has been admitted by the rehab service and and we were asked to see and assist with his medical problems, specifically relating to his blood pressure.  He was initially cared for at [facility] admitted there for 521 for nontraumatic ICH.  No aneurysms identified on angiogram.  Felt likely to be blood pressure related.  Patient reports poorly controlled blood pressures even before recent admission.  Was having issues with allergic reaction to thiazide diuretic.  He was treated with multiple IV agents at other facility but has been reasonably controlled here.  Reported systolic blood pressure goal of less than 140.  Blood pressure this afternoon here 135/68.  He had a blood pressure of 179/80 yesterday for which amlodipine 5 mg with dinner was added.  At the time of my exam he is without chest pain, headache, palpitations etc.  He is feeling much better and is looking forward to going home soon.      Past Medical History:   Diagnosis Date   • Arthritis    • Asthma    • Elevated cholesterol    • Hypertension    • Stroke (CMS/HCC)      Past Surgical History:   Procedure Laterality Date   • ABDOMINAL SURGERY     • APPENDECTOMY     • COLONOSCOPY     • EYE SURGERY     • FRACTURE SURGERY     • TONSILLECTOMY       Family History   Problem Relation Age of Onset   • Cancer Father    • Cancer Brother    • Diabetes  Maternal Grandmother      Social History     Tobacco Use   • Smoking status: Current Every Day Smoker     Packs/day: 0.25     Years: 25.00     Pack years: 6.25     Start date: 4/13/1995   • Smokeless tobacco: Never Used   Substance Use Topics   • Alcohol use: Yes     Alcohol/week: 1.0 standard drinks     Types: 1 Shots of liquor per week   • Drug use: Never     Medications Prior to Admission   Medication Sig Dispense Refill Last Dose   • albuterol sulfate  (90 Base) MCG/ACT inhaler Inhale 2 puffs Every 6 (Six) Hours As Needed for Wheezing.      • clobetasol (TEMOVATE) 0.05 % cream Apply 1 g topically to the appropriate area as directed Daily.      • ezetimibe (ZETIA) 10 MG tablet Take 10 mg by mouth Daily.   4/13/2021 at Unknown time   • fluticasone (FLONASE) 50 MCG/ACT nasal spray 1 spray into the nostril(s) as directed by provider Daily.      • hydroCHLOROthiazide (HYDRODIURIL) 12.5 MG tablet Take 12.5 mg by mouth Daily.      • nicotine (NICODERM CQ) 21 MG/24HR patch Place 1 patch on the skin as directed by provider Daily.      • predniSONE (DELTASONE) 10 MG tablet Take 10 mg by mouth Daily.        Allergies:  Pravastatin and Hydrochlorothiazide    Review of Systems   Constitutional: Negative.    HENT: Negative.    Eyes: Negative.    Respiratory: Negative.    Gastrointestinal: Negative.    Endocrine: Negative.    Genitourinary: Negative.    Musculoskeletal: Negative.    Skin: Negative.    Neurological: Negative.    Hematological: Negative.    Psychiatric/Behavioral: Negative.        Objective      Vital Signs  Temp:  [97.8 °F (36.6 °C)-98.5 °F (36.9 °C)] 97.8 °F (36.6 °C)  Heart Rate:  [83-89] 87  Resp:  [16-18] 16  BP: (128-176)/(60-72) 135/68  Body mass index is 20.92 kg/m².    Physical Exam  Vitals reviewed.   Constitutional:       Appearance: Normal appearance. He is well-developed.   HENT:      Head: Normocephalic and atraumatic.   Eyes:      General: No scleral icterus.     Conjunctiva/sclera:  Conjunctivae normal.   Neck:      Vascular: No JVD.   Cardiovascular:      Rate and Rhythm: Normal rate. Rhythm irregular.      Heart sounds: No murmur heard.        Comments: Suspect PAC/PVCs  Pulmonary:      Effort: Pulmonary effort is normal. No respiratory distress.      Breath sounds: Normal breath sounds.   Abdominal:      General: Bowel sounds are normal. There is no distension.      Palpations: Abdomen is soft.      Tenderness: There is no abdominal tenderness.   Musculoskeletal:      Cervical back: Normal range of motion and neck supple.   Skin:     General: Skin is warm and dry.   Neurological:      Mental Status: He is alert and oriented to person, place, and time.      Cranial Nerves: No cranial nerve deficit.   Psychiatric:         Behavior: Behavior normal.         Results Review:   I reviewed the patient's new clinical results.  I reviewed the patient's new imaging results and agree with the interpretation.  I reviewed the patient's other test results and agree with the interpretation  I personally viewed and interpreted the patient's EKG/Telemetry data         Assessment/Plan     Active Hospital Problems    Diagnosis  POA   • **Nontraumatic acute cerebral hemorrhage (CMS/HCC) [I61.9]  Yes   • Hypertension [I10]  Yes   • Irregular heartbeat [I49.9]  Yes       As stated most recent /68.  This currently at goal and I do not recommend any additional changes to regimen at present.  Agree with addition of amlodipine 5 mg with dinner which he will receive again this evening.  Will follow-up over next day or 2 to see how the blood pressure responds.  Will need close outpatient follow-up regarding his hypertension.    Patient had irregular heart rhythm on exam likely consistent with ectopy.  I cannot find previous EKG here or at other facility.  Will obtain and follow-up.        Thank you very much for asking [facility] to be involved in this patient's care. We will follow along with you.      Justen Correa,  MD [facility]  04/15/21  16:36 EDT

## 2021-04-15 NOTE — PROGRESS NOTES
LOS: 2 days   Patient Care Team:  Marianna Billings APRN as PCP - General (Nurse Practitioner)      SALLY KEE  1950    Chief Complaint:   Status post right frontoparietal intraparenchymal hemorrhage-4.5 x 1.7 cm-April 5, 2021  Hypertension-l   Emphysema-bronchodilator inhaler  Impaired cognition/mobility/self-care  Sundowning-Seroquel low-dose at night  DVT prophylaxis-SCDs           Subjective      No headache. No focal weakness. Mobilizing well.  /72 this AM, received hydralazine 20 mg prn, 131/66 this afternoon  Patient discussed with IM  Objective     Vitals:    04/15/21 1714   BP: 131/66   Pulse: 86   Resp:    Temp:    SpO2: 97%       PHYSICAL EXAM:   MENTAL STATUS -  AWAKE / ALERT  HEENT-      LUNGS - CTA   HEART- RRR  ABD -   SOFT, NT.    EXT - NO EDEMA OR CYANOSIS  NEURO -oriented to person place time and situation.  Appropriate.  Speech is fluent.       Good coordination in his hands.  MOTOR EXAM - RUE/RLE 5/5. LUE/LLE 5/5.              MEDICATIONS  Scheduled Meds:amLODIPine, 5 mg, Oral, Daily Before Supper  budesonide-formoterol, 2 puff, Inhalation, BID - RT  fluticasone, 1 spray, Each Nare, Daily  lisinopril, 40 mg, Oral, Q24H  metoprolol tartrate, 100 mg, Oral, Q12H      Continuous Infusions:   PRN Meds:.•  acetaminophen  •  hydrALAZINE  •  ipratropium-albuterol  •  QUEtiapine      RESULTS  No results found for: POCGLU  Results from last 7 days   Lab Units 04/14/21  1001   WBC 10*3/mm3 10.27   HEMOGLOBIN g/dL 12.8*   HEMATOCRIT % 37.3*   PLATELETS 10*3/mm3 255     Results from last 7 days   Lab Units 04/14/21  1001   SODIUM mmol/L 138   POTASSIUM mmol/L 4.4   CHLORIDE mmol/L 102   CO2 mmol/L 29.0   BUN mg/dL 17   CREATININE mg/dL 1.02   CALCIUM mg/dL 8.7   BILIRUBIN mg/dL 0.3   ALK PHOS U/L 131*   ALT (SGPT) U/L 131*   AST (SGOT) U/L 68*   GLUCOSE mg/dL 103*       April 12-sodium 136, potassium 3.3, chloride 105, carbon dioxide 27, glucose 101, blood urea nitrogen 16, creatinine 1.13  April  8-hemoglobin A1c 5.3  April 7-WBC 14.4, hemoglobin 12.9, hematocrit 30.6, platelets 188.  April 5-total protein 7.4, BUN 4.4, calcium 8.9, total bili 0.6, AST 31, ALT 26, alkaline phosphatase 79    Assessment/Plan     Nontraumatic acute cerebral hemorrhage (CMS/HCC)    Hypertension    Irregular heartbeat      Status post right frontoparietal intraparenchymal hemorrhage-4.5 x 1.7 cm-April 5, 2021    Impaired cognition/impaired mobility/impaired self-care     hypertension-April 14-blood pressure elevated 125//50.  Has been on lisinopril 40 mg daily and metoprolol 100 mg twice a day.  On hydralazine 10 mg every 6 hours as needed, systolic blood pressure greater than 160, will increase that to 20 mg as needed dose.  Add amlodipine 5 mg q. Evening.  April 15 - /72 this AM, received hydralazine 20 mg prn, 131/66 this afternoon. Internal Medicine consulted for input as anticipated discharge from rehab soon.     Emphysema-bronchodilator inhaler    Impaired cognition/mobility/self-care    Sundowning-Seroquel low-dose at night    DVT prophylaxis-SCDs    Transaminitis-April 14-AST/ALT increased compared to April 5.  Etiology unclear as no intervening labs.  Present medications reviewed.  Recheck later this week.  April 15 - repeat labs in AM    TEAM CONF - APRIL 15- BED SUP. TRANSFERS SBA -SUP. 12 STAIRS SBA-CTG. GAIT 400 FEET SBA-CTG.  TOILET AND SHOWER TRANSFES CTG. BATH CTG. LBD CTG. UBD SET UP. GROOMING SET UP.  CLQT MILDLY IMPAIRED FOR ATTENTION AND VISUAL SPATIAL, MODERATE IMPAIRED FOR MEMORY. CONTINENT BOWEL AND BLADDER. STILL HYPERTENSIVE - WILL GET INPUT FROM INTERNAL MEDICINE ON BLOOD PRESSURE MANAGEMENT AS ANTICIPATE HOME SOON FROM REHAB STANDPOINT.   ELOS- APRIL 17.      Now admit for comprehensive acute inpatient rehabilitation .  This would be an interdisciplinary program with physical therapy 1 hour,  occupational therapy 1 hour, and speech therapy 1 hour, 5 days a week.  Rehabilitation nursing for  carryover, monitoring of hypertension and neurologic   status, bowel and bladder, and skin  Ongoing physician follow-up.  Weekly team conferences.  Goals are to achieve a level of supervision with  mobility and self-care and improved balance.   Rehabilitation prognosis fair.  Medical prognosis fair.  Estimated length of stay is approximately 10 days, but is only an estimation.   The patient's functional status and clinical status is unchanged from preadmission assessment and the patient continues appropriate for acute inpatient rehabilitation.  Goal is for home with outpatient   therapies.  Barrier to discharge: Impaired mobility self-care- work on balance transfers gait ADLs to overcome.            Nomi Ibarra MD      During rounds, used appropriate personal protective equipment including mask and gloves.  Additional gown if indicated.  Mask used was standard procedure mask. Appropriate PPE was worn during the entire visit.  Hand hygiene was completed before and after.

## 2021-04-15 NOTE — THERAPY TREATMENT NOTE
Inpatient Rehabilitation - Occupational Therapy Treatment Note    [facility]     Patient Name: Chad Hansen  : 1950  MRN: [id number]    Today's Date: 4/15/2021                 Admit Date: 2021       No diagnosis found.    Patient Active Problem List   Diagnosis   • Nontraumatic acute cerebral hemorrhage (CMS/HCC)       Past Medical History:   Diagnosis Date   • Arthritis    • Asthma    • Elevated cholesterol    • Hypertension    • Stroke (CMS/HCC)        Past Surgical History:   Procedure Laterality Date   • ABDOMINAL SURGERY     • APPENDECTOMY     • COLONOSCOPY     • EYE SURGERY     • FRACTURE SURGERY     • TONSILLECTOMY                  IRF OT ASSESSMENT FLOWSHEET (last 12 hours)      IRF OT Evaluation and Treatment     Row Name 04/15/21 1003          OT Time and Intention    Document Type  daily treatment  -SG     Mode of Treatment  occupational therapy  -     Patient Effort  good  -     Symptoms Noted During/After Treatment  none  -     Row Name 04/15/21 1003          Cognition/Psychosocial    Orientation Status (Cognition)  oriented x 3  -SG     Follows Commands (Cognition)  follows multi-step commands  -     Personal Safety Interventions  gait belt;fall prevention program maintained  -     Row Name 04/15/21 1003          Functional Mobility    Functional Mobility- Ind. Level  standby assist  -     Functional Mobility- Comment  Walks to shower room  -     Row Name 04/15/21 1003          Transfer Assessment/Treatment    Transfers  sit-stand transfer;stand-sit transfer;shower transfer  -     Row Name 04/15/21 1003          Transfers    Sit-Stand [place] (Transfers)  standby assist  -     Stand-Sit [place] (Transfers)  standby assist  -     [place] Level (Shower Transfer)  minimum assist (75% patient effort);stand by assist LOB standing from shower chair on uneven surface  -     Row Name 04/15/21 1003          Shower Transfer    Type (Shower Transfer)  stand  pivot/stand step  -     Row Name 04/15/21 1003          Bathing    [place] Level (Bathing)  bathing skills;standby assist  -     Assistive Device (Bathing)  grab bar/tub rail;hand held shower spray hose;shower chair  -     Position (Bathing)  unsupported sitting;supported standing  -     Row Name 04/15/21 1003          Upper Body Dressing    [place] Level (Upper Body Dressing)  upper body dressing skills;set up assistance  -     Position (Upper Body Dressing)  supported sitting  -     Row Name 04/15/21 1003          Lower Body Dressing    [place] Level (Lower Body Dressing)  doff;don;pants/bottoms;shoes/slippers;socks;underwear;standby assist  -     Position (Lower Body Dressing)  unsupported sitting;unsupported standing  -     Comment (Lower Body Dressing)  Vcing to stay seated  -     Row Name 04/15/21 1003          Grooming    [place] Level (Grooming)  grooming skills;deodorant application;hair care, combing/brushing;oral care regimen;wash face, hands;supervision  -     Position (Grooming)  sink side;unsupported standing  -SG     Row Name 04/15/21 1003          Positioning and Restraints    Pre-Treatment Position  sitting in chair/recliner  -     Post Treatment Position  chair  -SG     In Chair  sitting;call light within reach;encouraged to call for assist;exit alarm on  -SG       User Key  (r) = Recorded By, (t) = Taken By, (c) = Cosigned By    Initials Name Effective Dates     Shanti Morales OTR 12/26/18 -            Occupational Therapy Education                 Title: PT OT SLP Therapies (In Progress)     Topic: Occupational Therapy (In Progress)     Point: ADL training (Done)     Description:   Instruct learner(s) on proper safety adaptation and remediation techniques during self care or transfers.   Instruct in proper use of assistive devices.              Learning Progress Summary           Patient Acceptance, E,TB, VU by  at 4/14/2021 1227    Comment: OT  role and goals, safety concerns                   Point: Home exercise program (Not Started)     Description:   Instruct learner(s) on appropriate technique for monitoring, assisting and/or progressing therapeutic exercises/activities.              Learner Progress:  Not documented in this visit.          Point: Precautions (Not Started)     Description:   Instruct learner(s) on prescribed precautions during self-care and functional transfers.              Learner Progress:  Not documented in this visit.          Point: Body mechanics (Not Started)     Description:   Instruct learner(s) on proper positioning and spine alignment during self-care, functional mobility activities and/or exercises.              Learner Progress:  Not documented in this visit.                      User Key     Initials Effective Dates Name Provider Type Discipline     12/26/18 -  Shanti Morales OTR Occupational Therapist OT                    OT Recommendation and Plan                   Outcome Measures     Row Name 04/14/21 1400             Rodriguez Balance Scale    Sitting to Standing  4  -LB      Standing Unsupported  4  -LB      Sitting with Back Unsupported but Feet Supported on Floor or on Stool  4  -LB      Standing to Sitting  4  -LB      Transfers  4  -LB      Standing Unsupported with Eyes Closed  4  -LB      Standing Unsupported with Feet Together  4  -LB      Reaching Forward with Outstretched Arm While Standing  4  -LB       Object From the Floor From a Standing Position  3  -LB      Turning to Look Behind Over Left and Right Shoulders While Standing  3  -LB      Turn 360 Degrees  4  -LB      Place Alternate Foot on Step or Stool While Standing Unsupported  4  -LB      Standing Unsupported with One Foot in Front  3  -LB      Standing on One Leg  3  -LB      Rodriguez Total Score  52  -LB      Rodriguez Comments  52/56- low risk for falls  -LB         Functional Assessment    Outcome Measure Options  Rodriguez Balance  -LB         User Key  (r) = Recorded By, (t) = Taken By, (c) = Cosigned By    Initials Name Provider Type    Carrie Trinidad, PT Physical Therapist            Time Calculation:     Time Calculation- OT     Row Name 04/15/21 1010             Time Calculation- OT    OT Start Time  0900  -SG      OT Stop Time  0930  -SG      OT Time Calculation (min)  30 min  -SG      OT Received On  04/15/21  -        User Key  (r) = Recorded By, (t) = Taken By, (c) = Cosigned By    Initials Name Provider Type    Shanti Grossman OTR Occupational Therapist        Therapy Charges for Today     Code Description Service Date Service Provider Modifiers Qty    43462799062 HC OT SELF CARE/MGMT/TRAIN EA 15 MIN 4/14/2021 Shanti Morales OTR GO 3    76173936338 HC OT EVAL MOD COMPLEXITY 2 4/14/2021 Shanti Morales OTR GO 1    14988016995 HC OT SELF CARE/MGMT/TRAIN EA 15 MIN 4/15/2021 Shanti Morales OTR GO 2                   JM Torres  4/15/2021   appropriate for situation

## 2021-04-15 NOTE — THERAPY TREATMENT NOTE
Inpatient Rehabilitation - Occupational Therapy Treatment Note    [facility]     Patient Name: Chad Hansen  : 1950  MRN: [id number]    Today's Date: 4/15/2021                 Admit Date: 2021       No diagnosis found.    Patient Active Problem List   Diagnosis   • Nontraumatic acute cerebral hemorrhage (CMS/HCC)       Past Medical History:   Diagnosis Date   • Arthritis    • Asthma    • Elevated cholesterol    • Hypertension    • Stroke (CMS/HCC)        Past Surgical History:   Procedure Laterality Date   • ABDOMINAL SURGERY     • APPENDECTOMY     • COLONOSCOPY     • EYE SURGERY     • FRACTURE SURGERY     • TONSILLECTOMY                  IRF OT ASSESSMENT FLOWSHEET (last 12 hours)      IRF OT Evaluation and Treatment     Row Name 04/15/21 1528 04/15/21 1003       OT Time and Intention    Document Type  daily treatment  -AF  daily treatment  -SG    Mode of Treatment  occupational therapy  -AF  occupational therapy  -SG    Patient Effort  good  -AF  good  -SG    Symptoms Noted During/After Treatment  --  none  -SG    Row Name 04/15/21 1528          General Information    General Observations of Patient  pt standing up in room   -AF     Limitations/Impairments  safety/cognitive  -AF     Row Name 04/15/21 1528 04/15/21 1003       Cognition/Psychosocial    Affect/Mental Status (Cognitive)  confused  -AF  --    Orientation Status (Cognition)  oriented x 3  -AF  oriented x 3  -SG    Follows Commands (Cognition)  follows two-step commands;over 90% accuracy;increased processing time needed;verbal cues/prompting required;repetition of directions required  -AF  follows multi-step commands  -SG    Personal Safety Interventions  fall prevention program maintained;gait belt;nonskid shoes/slippers when out of bed  -AF  gait belt;fall prevention program maintained  -SG    Cognitive Function (Cognitive)  memory deficit;safety deficit  -AF  --    Memory Deficit (Cognitive)  short-term memory;recall, recent events   -AF  --    Comment, Cognition  pt participated in coin sorting task without difficulty, pt ran into a problem when calculating the totals writing down the change amount like 175.00 not 1.75. required correction and mulitple attempts to self correct mistake. pt followed 4 step directions around the therapy gym with MOD vc's lonnie for recall of directions.  -AF  --    Comment, Cognitive Interventions  pt. participated in geoboard design recreation of 4 different overlapping shapes, pt recreated with MIN/MOD vc's   -AF  --    Row Name 04/15/21 1528          Pain Scale: Numbers Pre/Post-Treatment    Pretreatment Pain Rating  0/10 - no pain  -AF     Row Name 04/15/21 1528 04/15/21 1003       Functional Mobility    Functional Mobility- Ind. Level  --  standby assist  -SG    Functional Mobility- Comment  walks to and from room and therapy gym SBA and without vc's for directions   -AF  Walks to shower room  -    Row Name 04/15/21 1528 04/15/21 1003       Transfer Assessment/Treatment    Transfers  --  sit-stand transfer;stand-sit transfer;shower transfer  -SG    Comment (Transfers)  SBA with transfers during session  -AF  --    Row Name 04/15/21 1003          Transfers    Sit-Stand [place] (Transfers)  standby assist  -SG     Stand-Sit [place] (Transfers)  standby assist  -SG     [place] Level (Shower Transfer)  minimum assist (75% patient effort);stand by assist LOB standing from shower chair on uneven surface  -SG     Row Name 04/15/21 1003          Shower Transfer    Type (Shower Transfer)  stand pivot/stand step  -     Row Name 04/15/21 1528          Balance    Static Sitting Balance  WFL  -AF     Static Standing Balance  WFL  -AF     Comment, Balance  pt able to maneuver around therapy gym without LOB   -AF     Row Name 04/15/21 1003          Bathing    [place] Level (Bathing)  bathing skills;standby assist  -SG     Assistive Device (Bathing)  grab bar/tub rail;hand held shower spray hose;shower  chair  -SG     Position (Bathing)  unsupported sitting;supported standing  -     Row Name 04/15/21 1003          Upper Body Dressing    [place] Level (Upper Body Dressing)  upper body dressing skills;set up assistance  -SG     Position (Upper Body Dressing)  supported sitting  -     Row Name 04/15/21 1003          Lower Body Dressing    [place] Level (Lower Body Dressing)  doff;don;pants/bottoms;shoes/slippers;socks;underwear;standby assist  -SG     Position (Lower Body Dressing)  unsupported sitting;unsupported standing  -SG     Comment (Lower Body Dressing)  Vcing to stay seated  -     Row Name 04/15/21 1003          Grooming    [place] Level (Grooming)  grooming skills;deodorant application;hair care, combing/brushing;oral care regimen;wash face, hands;supervision  -SG     Position (Grooming)  sink side;unsupported standing  -SG     Row Name 04/15/21 1528 04/15/21 1003       Positioning and Restraints    Pre-Treatment Position  sitting in chair/recliner  -AF  sitting in chair/recliner  -SG    Post Treatment Position  chair  -AF  chair  -SG    In Chair  sitting;call light within reach;encouraged to call for assist;exit alarm on  -AF  sitting;call light within reach;encouraged to call for assist;exit alarm on  -SG      User Key  (r) = Recorded By, (t) = Taken By, (c) = Cosigned By    Initials Name Effective Dates     Shanti Morales, OTR 12/26/18 -     AF Bettye Melvin, OTR 04/14/20 -            Occupational Therapy Education                 Title: PT OT SLP Therapies (In Progress)     Topic: Occupational Therapy (In Progress)     Point: ADL training (Done)     Description:   Instruct learner(s) on proper safety adaptation and remediation techniques during self care or transfers.   Instruct in proper use of assistive devices.              Learning Progress Summary           Patient Acceptance, E,TB, VU by  at 4/14/2021 1227    Comment: OT role and goals, safety concerns                    Point: Home exercise program (Not Started)     Description:   Instruct learner(s) on appropriate technique for monitoring, assisting and/or progressing therapeutic exercises/activities.              Learner Progress:  Not documented in this visit.          Point: Precautions (Not Started)     Description:   Instruct learner(s) on prescribed precautions during self-care and functional transfers.              Learner Progress:  Not documented in this visit.          Point: Body mechanics (Not Started)     Description:   Instruct learner(s) on proper positioning and spine alignment during self-care, functional mobility activities and/or exercises.              Learner Progress:  Not documented in this visit.                      User Key     Initials Effective Dates Name Provider Type Discipline     12/26/18 -  Shanti Morales OTR Occupational Therapist OT                    OT Recommendation and Plan                   Outcome Measures     Row Name 04/14/21 1400             Rodriguez Balance Scale    Sitting to Standing  4  -LB      Standing Unsupported  4  -LB      Sitting with Back Unsupported but Feet Supported on Floor or on Stool  4  -LB      Standing to Sitting  4  -LB      Transfers  4  -LB      Standing Unsupported with Eyes Closed  4  -LB      Standing Unsupported with Feet Together  4  -LB      Reaching Forward with Outstretched Arm While Standing  4  -LB       Object From the Floor From a Standing Position  3  -LB      Turning to Look Behind Over Left and Right Shoulders While Standing  3  -LB      Turn 360 Degrees  4  -LB      Place Alternate Foot on Step or Stool While Standing Unsupported  4  -LB      Standing Unsupported with One Foot in Front  3  -LB      Standing on One Leg  3  -LB      Rodriguez Total Score  52  -LB      Rodriguez Comments  52/56- low risk for falls  -LB         Functional Assessment    Outcome Measure Options  Rodriguez Balance  -LB        User Key  (r) = Recorded By, (t) = Taken By, (c) =  Cosigned By    Initials Name Provider Type    LB Carrie Israel, PT Physical Therapist            Time Calculation:     Time Calculation- OT     Row Name 04/15/21 1534 04/15/21 1010          Time Calculation- OT    OT Start Time  1430  -AF  0900  -SG     OT Stop Time  1500  -AF  0930  -SG     OT Time Calculation (min)  30 min  -AF  30 min  -     OT Received On  --  04/15/21  -       User Key  (r) = Recorded By, (t) = Taken By, (c) = Cosigned By    Initials Name Provider Type    Shanti Grossman, OTR Occupational Therapist    AF Bettye Melvin, OTSIMBA Occupational Therapist        Therapy Charges for Today     Code Description Service Date Service Provider Modifiers Qty    95126449116 HC OT THERAPEUTIC ACT EA 15 MIN 4/15/2021 eBttye Melvin OTR GO 1    21060313416 HC OT SELF CARE/MGMT/TRAIN EA 15 MIN 4/15/2021 Bettye Melvin OTR GO 1                   JM Walter  4/15/2021

## 2021-04-15 NOTE — PROGRESS NOTES
STILL HYPERTENSIVE SBP THIS AM- WILL GET INPUT FROM INTERNAL MEDICINE ON BLOOD PRESSURE MANAGEMENT AS ANTICIPATE HOME SOON FROM REHAB STANDPOINT.

## 2021-04-15 NOTE — PROGRESS NOTES
Inpatient Rehabilitation Plan of Care Note    Plan of Care  Care Plan Reviewed - No updates at this time.    Psychosocial    Performed Intervention(s)  Verbalizes needs and concerns      Safety    Performed Intervention(s)  Wife staying overnight to monitor pt  Red arm band  Room close to nurses station  Safety rounds  Bed and/or chair alarm      Sphincter Control    Performed Intervention(s)  Monitor intake and output  Encourage fluid intake    Signed by: Radha Alvarado, RN

## 2021-04-15 NOTE — PROGRESS NOTES
TEAM CONF - APRIL 15- BED SUP. TRANSFERS SBA -SUP. 12 STAIRS SBA-CTG. GAIT 400 FEET SBA-CTG.  TOILET AND SHOWER TRANSFES CTG. BATH CTG. LBD CTG. UBD SET UP. GROOMING SET UP.  CLQT MILDLY IMPAIRED FOR ATTENTION AND VISUAL SPATIAL, MODERATE IMPAIRED FOR MEMORY. CONTINENT BOWEL AND BLADDER. STILL HYPERTENSIVE - WILL GET INPUT FROM INTERNAL MEDICINE ON BLOOD PRESSURE MANAGEMENT AS ANTICIPATE HOME SOON FROM REHAB STANDPOINT.   ELOS- APRIL 17.

## 2021-04-15 NOTE — PLAN OF CARE
Goal Outcome Evaluation:  Plan of Care Reviewed With: patient  Progress: improving  Outcome Summary: AAO but forgetful at times. Pt. consistently turned off bedalarm and got up to bathroom without waiting for staff. Urinary frequency and urgency.  As of 4 am, afetr repeated teaching,  he started to use his call light. Has HX of sundowning. Seroquel given at bedtime. No c/o pain. Hydralizine given this am. Goal is to keep SBP <140.

## 2021-04-15 NOTE — PROGRESS NOTES
Case Management  Inpatient Rehabilitation Team Conference    Conference Date/Time: 4/15/2021 6:53:29 AM    Team Conference Attendees:  Dr. Nomi Perez, Kiara Braden, Pharmacist  Ashley Alvarez, FANGW  Carrie Israel, PT  Shanti Morales, OT  Janice Morocho, SLP  Josey Sanchez, CTRS  Gwen Connell RD, LD  Kt Villanueva, RN  Radha Alvarado, RN   Gricel Lemus, PT Student    Demographics            Age: 71Y            Gender: Male    Admission Date: 4/13/2021 5:24:00 PM  Rehabilitation Diagnosis:  IPH  Past Medical History: Past Medical History:  DiagnosisDate  ?Arthritis?  ?Asthma?  ?Elevated cholesterol?  ?Hypertension?  ?Stroke (CMS/HCC)?    ?  ?  Surgical HistoryExpand by Default  Past Surgical History:  ProcedureLateralityDate  ?ABDOMINAL SURGERY??  ?APPENDECTOMY??  ?COLONOSCOPY??  ?EYE SURGERY??  ?FRACTURE SURGERY??  ?TONSILLECTOMY      Plan of Care  Anticipated Discharge Date/Estimated Length of Stay: ELOS: 10 days  Anticipated Discharge Destination: Community discharge with assistance  Discharge Plan : Pt plans to return home with his wife where he lives in a  single story ranch/w basement with 2 steps to enter home.  Medical Necessity Expected Level Rationale: Goals are to achieve a level of  supervision with  mobility and self-care and improved balance.   Rehabilitation  prognosis fair.  Medical prognosis fair.  Intensity and Duration: an average of 3 hours/5 days per week  Medical Supervision and 24 Hour Rehab Nursing: x  Physical Therapy: x  PT Intensity/Duration: 1 hour/day, 5 days/week for approximately 8 days  Occupational Therapy: x  OT Intensity/Duration: 1 hour/day, 5 days/week for approximately 8 days  Speech and Language Therapy: x  SLP Intensity/Duration: 1 hour/day, 5 days/week for approximately 8 days  Social Work: x  Therapeutic Recreation: x  Psychology: x  Updated (if changes indicated)    Anticipated Discharge Date/Estimated Length of Stay:   ELOS: DC 4/17    Based on the  patient's medical and functional status, their prognosis and  expected level of functional improvement is: Goals are to achieve a level of  supervision with  mobility and self-care and improved balance.   Rehabilitation  prognosis fair.  Medical prognosis fair.      Interdisciplinary Problem/Goals/Status    All Rehab Problems:  Cognition    [ST] Attention(Active)  Current Status(04/14/2021): Pt exhibits mild difficulty with attention to detail  and mild-moderate difficulty with alternating attention.  Weekly Goal(04/22/2021): Will complete functional attention to detail tasks with  NO cues.  Discharge Goal: Improved attention to detail skills for home environment.    [ST] Memory(Active)  Current Status(04/14/2021): Moderate memory deficits  Weekly Goal(04/22/2021): Will recall daily activities with MIN cues.  Discharge Goal: Improved memory for home environment.        Mobility    [PT] Bed/Chair/Wheelchair(Active)  Current Status(04/14/2021): SBA/Supervision  Weekly Goal(04/17/2021): Indep  Discharge Goal: Indep    [PT] Walk(Active)  Current Status(04/14/2021): 400 ft SBA- CGA  Weekly Goal(04/17/2021): on unit with supervision  Discharge Goal: 500 ft Supervision    [PT] Stairs(Active)  Current Status(04/14/2021): 12 HRs SBA/CGA  Weekly Goal(04/17/2021): PT only  Discharge Goal: 12 HR supervision    [OT] Toilet Transfers(Active)  Current Status(04/14/2021): CGA  Weekly Goal(04/21/2021): SUP  Discharge Goal: MOD I    [OT] Tub/Shower Transfers(Active)  Current Status(04/14/2021): CGA  Weekly Goal(04/21/2021): SUP  Discharge Goal: MOD I        Psychosocial    [RN] Coping/Adjustment(Active)  Current Status(04/13/2021): Expresses appropriate coping  Weekly Goal(04/20/2021): Allow opportunity to express concerns regarding current  status  Discharge Goal: Demonstrate healthy coping strategies        Safety    [RN] Potential for Injury(Active)  Current Status(04/13/2021): Pt is forgetful/impulsive  Weekly Goal(04/20/2021):  Free from falls  Discharge Goal: Pt/family will be aware of risk of falls and safety in the home        Self Care    [OT] Bathing(Active)  Current Status(04/14/2021): CGA  Weekly Goal(04/21/2021): SUP  Discharge Goal: MOD I    [OT] Dressing (Lower)(Active)  Current Status(04/14/2021): CGA  Weekly Goal(04/21/2021): SUP  Discharge Goal: MOD I    [OT] Dressing (Upper)(Active)  Current Status(04/14/2021): SETUP  Weekly Goal(04/21/2021): MOD I  Discharge Goal: MOD I    [OT] Grooming(Active)  Current Status(04/14/2021): SUP  Weekly Goal(04/21/2021): MOD I  Discharge Goal: MOD I    [OT] Toileting(Active)  Current Status(04/14/2021): CGA  Weekly Goal(04/21/2021): SUP  Discharge Goal: MOD I        Sphincter Control    [RN] Bladder Management(Active)  Current Status(04/13/2021): Continent 100%  Weekly Goal(04/20/2021): Continent 100%  Discharge Goal: Continent 100%    [RN] Bowel Management(Active)  Current Status(04/13/2021): Continent 100%  Weekly Goal(04/20/2021): Continent 100%  Discharge Goal: Continent 100%        Comments: 4/15: some LOB at times - able to correct himself; goes quick with  activities, does better when he slows down;    Signed by: Kt Villanueva RN    Physician CoSigned By: Nomi Ibarra 04/15/2021 07:35:03

## 2021-04-15 NOTE — PROGRESS NOTES
Spoke with pt at bedside and pt's wife Whit via phone to review goals,progress and ELOS discussed at treatment team today.  Informed pt and his wife that recommendation was made that pt will be ready for DC on Saturday 4/17/21.  Both pt and wife agreeable to pt being DC'ed on 4/17/21. Pt's wife agreeable to family conference to discuss pt's needs at home after DC and any after care follow that will be needed.  Family conference arrange for Friday 4/16/21 @ 12:30.

## 2021-04-15 NOTE — PROGRESS NOTES
Inpatient Rehabilitation Plan of Care Note    Plan of Care  Care Plan Reviewed - No updates at this time.    Psychosocial    Performed Intervention(s)  Verbalizes needs and concerns      Safety    Performed Intervention(s)  Wife staying overnight to monitor pt  Red arm band  Room close to nurses station  Safety rounds  Bed and/or chair alarm      Sphincter Control    Performed Intervention(s)  Monitor intake and output  Encourage fluid intake    Signed by: Emy Rivers RN

## 2021-04-16 LAB
ALBUMIN SERPL-MCNC: 3.5 G/DL (ref 3.5–5.2)
ALBUMIN/GLOB SERPL: 1.2 G/DL
ALP SERPL-CCNC: 123 U/L (ref 39–117)
ALT SERPL W P-5'-P-CCNC: 115 U/L (ref 1–41)
ANION GAP SERPL CALCULATED.3IONS-SCNC: 7.2 MMOL/L (ref 5–15)
AST SERPL-CCNC: 41 U/L (ref 1–40)
BILIRUB SERPL-MCNC: 0.2 MG/DL (ref 0–1.2)
BUN SERPL-MCNC: 18 MG/DL (ref 8–23)
BUN/CREAT SERPL: 17.3 (ref 7–25)
CALCIUM SPEC-SCNC: 9.3 MG/DL (ref 8.6–10.5)
CHLORIDE SERPL-SCNC: 103 MMOL/L (ref 98–107)
CO2 SERPL-SCNC: 27.8 MMOL/L (ref 22–29)
CREAT SERPL-MCNC: 1.04 MG/DL (ref 0.76–1.27)
GFR SERPL CREATININE-BSD FRML MDRD: 70 ML/MIN/1.73
GLOBULIN UR ELPH-MCNC: 2.9 GM/DL
GLUCOSE SERPL-MCNC: 79 MG/DL (ref 65–99)
POTASSIUM SERPL-SCNC: 4.8 MMOL/L (ref 3.5–5.2)
PROT SERPL-MCNC: 6.4 G/DL (ref 6–8.5)
SODIUM SERPL-SCNC: 138 MMOL/L (ref 136–145)

## 2021-04-16 PROCEDURE — 97112 NEUROMUSCULAR REEDUCATION: CPT | Performed by: OCCUPATIONAL THERAPIST

## 2021-04-16 PROCEDURE — 97129 THER IVNTJ 1ST 15 MIN: CPT

## 2021-04-16 PROCEDURE — 97110 THERAPEUTIC EXERCISES: CPT

## 2021-04-16 PROCEDURE — 97130 THER IVNTJ EA ADDL 15 MIN: CPT

## 2021-04-16 PROCEDURE — 97535 SELF CARE MNGMENT TRAINING: CPT | Performed by: OCCUPATIONAL THERAPIST

## 2021-04-16 PROCEDURE — 80053 COMPREHEN METABOLIC PANEL: CPT | Performed by: PHYSICAL MEDICINE & REHABILITATION

## 2021-04-16 PROCEDURE — 94799 UNLISTED PULMONARY SVC/PX: CPT

## 2021-04-16 PROCEDURE — 97112 NEUROMUSCULAR REEDUCATION: CPT

## 2021-04-16 RX ORDER — ALBUTEROL SULFATE 90 UG/1
2 AEROSOL, METERED RESPIRATORY (INHALATION) EVERY 6 HOURS PRN
Qty: 17 G | Refills: 1 | Status: SHIPPED | OUTPATIENT
Start: 2021-04-16

## 2021-04-16 RX ORDER — METOPROLOL TARTRATE 100 MG/1
100 TABLET ORAL EVERY 12 HOURS SCHEDULED
Qty: 60 TABLET | Refills: 1 | Status: SHIPPED | OUTPATIENT
Start: 2021-04-16

## 2021-04-16 RX ORDER — QUETIAPINE FUMARATE 25 MG/1
12.5 TABLET, FILM COATED ORAL NIGHTLY PRN
Qty: 5 TABLET | Refills: 0 | Status: SHIPPED | OUTPATIENT
Start: 2021-04-16

## 2021-04-16 RX ORDER — ACETAMINOPHEN 325 MG/1
650 TABLET ORAL EVERY 6 HOURS PRN
Start: 2021-04-16

## 2021-04-16 RX ORDER — LISINOPRIL 40 MG/1
40 TABLET ORAL
Qty: 30 TABLET | Refills: 1 | Status: SHIPPED | OUTPATIENT
Start: 2021-04-16

## 2021-04-16 RX ORDER — BUDESONIDE AND FORMOTEROL FUMARATE DIHYDRATE 160; 4.5 UG/1; UG/1
2 AEROSOL RESPIRATORY (INHALATION)
Qty: 1 EACH | Refills: 1 | Status: SHIPPED | OUTPATIENT
Start: 2021-04-16

## 2021-04-16 RX ORDER — AMLODIPINE BESYLATE 5 MG/1
5 TABLET ORAL
Qty: 30 TABLET | Refills: 1 | Status: SHIPPED | OUTPATIENT
Start: 2021-04-16 | End: 2021-06-23 | Stop reason: SDUPTHER

## 2021-04-16 RX ADMIN — METOPROLOL TARTRATE 100 MG: 50 TABLET, FILM COATED ORAL at 07:25

## 2021-04-16 RX ADMIN — METOPROLOL TARTRATE 100 MG: 50 TABLET, FILM COATED ORAL at 20:01

## 2021-04-16 RX ADMIN — BUDESONIDE AND FORMOTEROL FUMARATE DIHYDRATE 2 PUFF: 160; 4.5 AEROSOL RESPIRATORY (INHALATION) at 20:40

## 2021-04-16 RX ADMIN — LISINOPRIL 40 MG: 40 TABLET ORAL at 07:26

## 2021-04-16 RX ADMIN — BUDESONIDE AND FORMOTEROL FUMARATE DIHYDRATE 2 PUFF: 160; 4.5 AEROSOL RESPIRATORY (INHALATION) at 07:44

## 2021-04-16 RX ADMIN — QUETIAPINE FUMARATE 12.5 MG: 25 TABLET ORAL at 22:22

## 2021-04-16 RX ADMIN — FLUTICASONE PROPIONATE 1 SPRAY: 50 SPRAY, METERED NASAL at 07:26

## 2021-04-16 RX ADMIN — AMLODIPINE BESYLATE 5 MG: 5 TABLET ORAL at 17:01

## 2021-04-16 NOTE — THERAPY TREATMENT NOTE
Inpatient Rehabilitation - Speech Language Pathology Treatment Note  [facility]     Patient Name: Chad Hansen  : 1950  MRN: [id number]  Today's Date: 2021               Admit Date: 2021     Visit Dx:  No diagnosis found.  Patient Active Problem List   Diagnosis   • Nontraumatic acute cerebral hemorrhage (CMS/HCC)   • Hypertension   • Irregular heartbeat     Past Medical History:   Diagnosis Date   • Arthritis    • Asthma    • Elevated cholesterol    • Hypertension    • Stroke (CMS/HCC)      Past Surgical History:   Procedure Laterality Date   • ABDOMINAL SURGERY     • APPENDECTOMY     • COLONOSCOPY     • EYE SURGERY     • FRACTURE SURGERY     • TONSILLECTOMY          SLP EVALUATION (last 72 hours)      SLP SLC Evaluation     Row Name 21          Document Type  therapy note (daily note)  -AB    Subjective Information  no complaints  -AB    Patient Observations  alert;cooperative;agree to therapy  -AB    Patient/Family/Caregiver Comments/Observations  Pt receives therapy in room, cooperative and pleasant throughout.   -AB    Patient Effort  good  -AB   User Key  (r) = Recorded By, (t) = Taken By, (c) = Cosigned By    Initials Name Effective Dates    AB Mary Lou Groves, MS CCC-SLP 10/05/20 -              EDUCATION  The patient has been educated in the following areas:     Cognitive Impairment.    SLP Recommendation and Plan  Continue skilled speech language treatment. Planned family conference this PM    SLP GOALS     Row Name 2130       Attention Goal 1 (SLP)    Improve Attention by Goal 1 (SLP)  complete selective attention task;80%;independently (over 90% accuracy)  -AB    Progress/Outcomes (Attention Goal 1, SLP)  goal ongoing  -AB       Memory Skills Goal 1 (SLP)    Improve Memory Skills Through Goal 1 (SLP)  listen to a paragraph and answer questions;recall details of the day;80%;independently (over 90% accuracy)  -AB    Progress/Outcomes (Memory Skills Goal 1, SLP)   "goal ongoing  -AB    Comment (Memory Skills Goal 1, SLP)  Recalled 2/3 errands after 20 min w/NO cues; 3/3 w/MIN cues  -AB       Organizational Skills Goal 1 (SLP)    Improve Thought Organization Through Goal 1 (SLP)  completing a divergent naming task;naming similarities and differences;80%;independently (over 90% accuracy)  -AB    Progress/Outcomes (Thought Organization Skills Goal 1, SLP)  goal ongoing  -AB       Reasoning Goal 1 (SLP)    Improve Reasoning Through Goal 1 (SLP)  complete deductive reasoning task;complete mental flexibility task;80%;independently (over 90% accuracy)  -AB    Progress/Outcomes (Reasoning Goal 1, SLP)  goal ongoing  -AB    Comment (Reasoning Goal 1, SLP)  --       Functional Math Skills Goal 1 (SLP)    Improve Functional Math Skills Through Goal 1 (SLP)  complete word problems involving time;complete word problems involving money;80%;independently (over 90% accuracy)  -AB    Progress/Outcomes (Functional Math Skills Goal 1, SLP)  goal ongoing  -AB       Executive Functional Skills Goal 1 (SLP)    Improve Executive Function Skills Goal 1 (SLP)  demonstrate awareness of deficit;identify strategies, strengths, limitations;home management activity;80%;independently (over 90% accuracy)  -AB    Progress/Outcomes (Executive Function Skills Goal 1, SLP)  goal ongoing  -AB    Comment (Executive Function Skills Goal 1, SLP)  med management task-initial estimate of meds/type taken rated at 5/9. SLP aided with visual organizer from printout. From this information, pt asked to characterize medicine as daily or as needed w/4/9 accuracy despite printout, color coded information provided. Errors appear secondary to impulsivity and pt perseverative on previous medications and dosage levels. Problem solving for meds (i.e. \"Why would you take a blood pressure medicine?\" w/4/6 accuracy MIN cues; 5/6 MAX cues. Comprehension from written directions and visual organizer-6/9, w/MIN to MOD cues, again " appearing direct correlate to impulsivity.  -AB      User Key  (r) = Recorded By, (t) = Taken By, (c) = Cosigned By    Initials Name Provider Type    Mary Lou Villafuerte MS CCC-SLP Speech and Language Pathologist            Time Calculation:     Time Calculation- SLP     Row Name 04/16/21 1011             Time Calculation- SLP    SLP Start Time  0830  -AB      SLP Stop Time  0900  -AB      SLP Time Calculation (min)  30 min  -AB        User Key  (r) = Recorded By, (t) = Taken By, (c) = Cosigned By    Initials Name Provider Type    Mary Lou Villafuerte MS CCC-SLP Speech and Language Pathologist          Therapy Charges for Today     Code Description Service Date Service Provider Modifiers Qty    09932985199 HC ST DEV OF COGN SKILLS INITIAL 15 MIN 4/16/2021 Mary Lou Groves MS CCC-SLP  1    77888218391 HC ST DEV OF COGN SKILLS EACH ADDT'L 15 MIN 4/16/2021 Mary Lou Groves MS CCC-SLP  1        PPE:  Patient was not wearing a face mask during this therapy encounter. Therapist used appropriate personal protective equipment including mask, eye protection and gloves.  Mask used was standard procedure mask. Appropriate PPE was worn during the entire therapy session. The patient did not cough during this evaluation. Therapist was within 6 feet for 15 minutes or more during the evaluation. Hand hygiene was completed before and after therapy session. Patient is not in enhanced droplet precautions.                Mary Lou Groves MS CCC-SLP  4/16/2021

## 2021-04-16 NOTE — THERAPY TREATMENT NOTE
"Inpatient Rehabilitation - Speech Language Pathology Treatment Note  [facility]     Patient Name: Chad Hansen  : 1950  MRN: [id number]  Today's Date: 2021               Admit Date: 2021     Visit Dx:  No diagnosis found.  Patient Active Problem List   Diagnosis   • Nontraumatic acute cerebral hemorrhage (CMS/HCC)   • Hypertension   • Irregular heartbeat     Past Medical History:   Diagnosis Date   • Arthritis    • Asthma    • Elevated cholesterol    • Hypertension    • Stroke (CMS/HCC)      Past Surgical History:   Procedure Laterality Date   • ABDOMINAL SURGERY     • APPENDECTOMY     • COLONOSCOPY     • EYE SURGERY     • FRACTURE SURGERY     • TONSILLECTOMY          SLP EVALUATION (last 72 hours)      SLP SLC Evaluation     Row Name 21 5330       Communication Assessment/Intervention    Document Type  therapy note (daily note)  -AB    Subjective Information  no complaints  -AB    Patient Observations  alert;cooperative  -AB    Patient/Family/Caregiver Comments/Observations  Pt is up in room, amenable to tx session, although begins disparaging comments regarding \"I'm fine and I'm getting out of here anyway.\"  -AB    Patient Effort  adequate  -AB      User Key  (r) = Recorded By, (t) = Taken By, (c) = Cosigned By    Initials Name Effective Dates    AB Mary Lou Groves, MS CCC-SLP 10/05/20 -              EDUCATION  The patient has been educated in the following areas:     Cognitive Impairment.    SLP Recommendation and Plan  Recommend OP services regarding cognition as pt continues to exhibit deficits in areas of memory, attention, flexible thinking, executive function     SLP GOALS     Row Name 21 1330          Improve Attention by Goal 1 (SLP)  complete selective attention task;80%;independently (over 90% accuracy)  -AB    Progress/Outcomes (Attention Goal 1, SLP)  goal ongoing  -AB    Comment (Attention Goal 1, SLP)  2 step/4 component direction task: 6 (I); /6 w/MAX cues, " "breakdown of steps into 1 piece components, reduce visual distractions, require pt to read directions aloud, and repeat at least x2 for each component   -AB          Improve Memory Skills Through Goal 1 (SLP)  listen to a paragraph and answer questions;recall details of the day;80%;independently (over 90% accuracy)  -AB    Progress/Outcomes (Memory Skills Goal 1, SLP)  goal ongoing  -AB    Comment (Memory Skills Goal 1, SLP)  --          Improve Thought Organization Through Goal 1 (SLP)  completing a divergent naming task;naming similarities and differences;80%;independently (over 90% accuracy)  -AB    Progress/Outcomes (Thought Organization Skills Goal 1, SLP)  goal ongoing  -AB          Improve Reasoning Through Goal 1 (SLP)  complete deductive reasoning task;complete mental flexibility task;80%;independently (over 90% accuracy)  -AB    Progress/Outcomes (Reasoning Goal 1, SLP)  goal ongoing  -AB    Comment (Reasoning Goal 1, SLP)  deductive reasonin/8 (I); 3/8 max cues. stimuli included x2 portions, x6 additional component pieces of 2 step direction task with highly increased task latency. pt presents with profound difficulties within this task. examples include \"If a match burns, [place] items that are hot. If a match does not burn, [place] items that are cold.\" Pt presents with tangential speech, makes unrelated connections from simuli such as \"Well I see match listed down there and it's hot and cold, so that must be the answer.\" Even in presence of maximal cues, pt only increases deductive reasoning x1 as he presents with concrete and unrelated thinking. Additional example/isolated to attempt further aid deduction skills includes \"if your hair is gray, touch your head. If your hair is purple, touch your arm .\" Pt begins speech \"how do you know what color my hair is? How could any of us tell what color anything is?\" and is unable to respond to stimuli regarding color naming within room. -AB          Improve " Functional Math Skills Through Goal 1 (SLP)  complete word problems involving time;complete word problems involving money;80%;independently (over 90% accuracy)  -AB    Progress/Outcomes (Functional Math Skills Goal 1, SLP)  goal ongoing  -AB          Improve Executive Function Skills Goal 1 (SLP)  demonstrate awareness of deficit;identify strategies, strengths, limitations;home management activity;80%;independently (over 90% accuracy)  -AB    Progress/Outcomes (Executive Function Skills Goal 1, SLP)  goal ongoing  -AB    Comment (Executive Function Skills Goal 1, SLP)  --      User Key  (r) = Recorded By, (t) = Taken By, (c) = Cosigned By    Initials Name Provider Type    AB Mary Lou Groves, MS CCC-SLP Speech and Language Pathologist              Time Calculation:     Time Calculation- SLP     Row Name 04/16/21 1453 04/16/21 1011          Time Calculation- SLP    SLP Start Time  1330  -AB  0830  -AB     SLP Stop Time  1400  -AB  0900  -AB     SLP Time Calculation (min)  30 min  -AB  30 min  -AB       User Key  (r) = Recorded By, (t) = Taken By, (c) = Cosigned By    Initials Name Provider Type    Mary Lou Villafuerte, MS CCC-SLP Speech and Language Pathologist          Therapy Charges for Today     Code Description Service Date Service Provider Modifiers Qty    74938588799 HC ST DEV OF COGN SKILLS INITIAL 15 MIN 4/16/2021 Mary Lou Groves MS CCC-SLP  1    45413273544 HC ST DEV OF COGN SKILLS EACH ADDT'L 15 MIN 4/16/2021 Mary Lou Groves MS CCC-SLP  3          PPE:  Patient was wearing a face mask during this therapy encounter. Therapist used appropriate personal protective equipment including mask, eye protection and gloves.  Mask used was standard procedure mask. Appropriate PPE was worn during the entire therapy session. The patient did not cough during this evaluation. Therapist was within 6 feet for 15 minutes or more during the evaluation. Hand hygiene was completed before and after therapy session. Patient is  not in enhanced droplet precautions.              Mary Lou Groves, MS CCC-SLP  4/16/2021

## 2021-04-16 NOTE — PROGRESS NOTES
LOS: 3 days   Patient Care Team:  Marianna Billings APRN as PCP - General (Nurse Practitioner)      SALLY KEE  1950    Chief Complaint:   Status post right frontoparietal intraparenchymal hemorrhage-4.5 x 1.7 cm-April 5, 2021  Hypertension-l   Emphysema-bronchodilator inhaler  Impaired cognition/mobility/self-care  Sundowning-Seroquel low-dose at night  DVT prophylaxis-SCDs           Subjective      No headache. No focal weakness. Mobilizing well.  Blood pressure pattern better over the last day.      Objective     Vitals:    04/16/21 0745   BP:    Pulse:    Resp:    Temp:    SpO2: 93%       PHYSICAL EXAM:   MENTAL STATUS -  AWAKE / ALERT  HEENT-      LUNGS - CTA   HEART- RRR  ABD -   SOFT, NT.    EXT - NO EDEMA OR CYANOSIS  NEURO -oriented to person place time and situation.  Appropriate.  Speech is fluent.       Good coordination in his hands.  MOTOR EXAM - RUE/RLE 5/5. LUE/LLE 5/5.              MEDICATIONS  Scheduled Meds:amLODIPine, 5 mg, Oral, Daily Before Supper  budesonide-formoterol, 2 puff, Inhalation, BID - RT  fluticasone, 1 spray, Each Nare, Daily  lisinopril, 40 mg, Oral, Q24H  metoprolol tartrate, 100 mg, Oral, Q12H      Continuous Infusions:   PRN Meds:.•  acetaminophen  •  hydrALAZINE  •  ipratropium-albuterol  •  QUEtiapine      RESULTS  No results found for: POCGLU  Results from last 7 days   Lab Units 04/14/21  1001   WBC 10*3/mm3 10.27   HEMOGLOBIN g/dL 12.8*   HEMATOCRIT % 37.3*   PLATELETS 10*3/mm3 255     Results from last 7 days   Lab Units 04/16/21  0648 04/14/21  1001   SODIUM mmol/L 138 138   POTASSIUM mmol/L 4.8 4.4   CHLORIDE mmol/L 103 102   CO2 mmol/L 27.8 29.0   BUN mg/dL 18 17   CREATININE mg/dL 1.04 1.02   CALCIUM mg/dL 9.3 8.7   BILIRUBIN mg/dL 0.2 0.3   ALK PHOS U/L 123* 131*   ALT (SGPT) U/L 115* 131*   AST (SGOT) U/L 41* 68*   GLUCOSE mg/dL 79 103*       April 12-sodium 136, potassium 3.3, chloride 105, carbon dioxide 27, glucose 101, blood urea nitrogen 16, creatinine  1.13  April 8-hemoglobin A1c 5.3  April 7-WBC 14.4, hemoglobin 12.9, hematocrit 30.6, platelets 188.  April 5-total protein 7.4, BUN 4.4, calcium 8.9, total bili 0.6, AST 31, ALT 26, alkaline phosphatase 79    Assessment/Plan     Nontraumatic acute cerebral hemorrhage (CMS/HCC)    Hypertension    Irregular heartbeat      Status post right frontoparietal intraparenchymal hemorrhage-4.5 x 1.7 cm-April 5, 2021    Impaired cognition/impaired mobility/impaired self-care     hypertension-April 14-blood pressure elevated 125//50.  Has been on lisinopril 40 mg daily and metoprolol 100 mg twice a day.  On hydralazine 10 mg every 6 hours as needed, systolic blood pressure greater than 160, will increase that to 20 mg as needed dose.  Add amlodipine 5 mg q. Evening.  April 15 - /72 this AM, received hydralazine 20 mg prn, 131/66 this afternoon. Internal Medicine consulted for input as anticipated discharge from rehab soon.   April 16-blood pressure pattern better over the past day    Emphysema-bronchodilator inhaler    Impaired cognition/mobility/self-care    Sundowning-Seroquel low-dose at night    DVT prophylaxis-SCDs    Transaminitis-April 14-AST/ALT increased compared to April 5.  Etiology unclear as no intervening labs.  Present medications reviewed.  Recheck later this week.  April 16 -AST/ALT trend better.    TEAM CONF - APRIL 15- BED SUP. TRANSFERS SBA -SUP. 12 STAIRS SBA-CTG. GAIT 400 FEET SBA-CTG.  TOILET AND SHOWER TRANSFES CTG. BATH CTG. LBD CTG. UBD SET UP. GROOMING SET UP.  CLQT MILDLY IMPAIRED FOR ATTENTION AND VISUAL SPATIAL, MODERATE IMPAIRED FOR MEMORY. CONTINENT BOWEL AND BLADDER. STILL HYPERTENSIVE - WILL GET INPUT FROM INTERNAL MEDICINE ON BLOOD PRESSURE MANAGEMENT AS ANTICIPATE HOME SOON FROM REHAB STANDPOINT.   ELOS- APRIL 17.     BNE (Active)-April 16  Att'n. - WNL  Exec. Fx. - Mildly Imp.  Rsng/Jgmnt - Mod. Imp. for abstract reasoning, WNL for common sense jgmnt  Arith - WNL  Visuospatial  Skills - WNL  Visual Mem. - WNL  Verbal Mem. - Mildly imp. for immediate recall, Severely Imp. for delayed  recall; improved with cues  Emot - Pt denied dep/anx     Now admit for comprehensive acute inpatient rehabilitation .  This would be an interdisciplinary program with physical therapy 1 hour,  occupational therapy 1 hour, and speech therapy 1 hour, 5 days a week.  Rehabilitation nursing for carryover, monitoring of hypertension and neurologic   status, bowel and bladder, and skin  Ongoing physician follow-up.  Weekly team conferences.  Goals are to achieve a level of supervision with  mobility and self-care and improved balance.   Rehabilitation prognosis fair.  Medical prognosis fair.  Estimated length of stay is approximately 10 days, but is only an estimation.   The patient's functional status and clinical status is unchanged from preadmission assessment and the patient continues appropriate for acute inpatient rehabilitation.  Goal is for home with outpatient   therapies.  Barrier to discharge: Impaired mobility self-care- work on balance transfers gait ADLs to overcome.      Anticipate discharge home on Saturday, April 17.  Medications and follow-up reviewed.  No driving.  Outpatient speech therapy at [facility].      Nomi Ibarra MD      During rounds, used appropriate personal protective equipment including mask and gloves.  Additional gown if indicated.  Mask used was standard procedure mask. Appropriate PPE was worn during the entire visit.  Hand hygiene was completed before and after.

## 2021-04-16 NOTE — PROGRESS NOTES
Family conference held today with patient and wife, PT, OT, ST, NSG, and SW present. Discussed progress and d/c recommendations.  PT reported patient doing well from mobility standpoint. PT has been working on high level balance activities and dual tasks while walking. Patient at SB/Modified Independent level with all mobility. Balance test showed low risk for falls. PT did report patient tends to walk and turn fast so suggested he slow down some to increase safety.   OT reported patient did have loss of balance in shower yesterday, but no loss today. OT reported patient at SBA level with bathing due to risk for loss of balance and recommended patient have shower chair so can sit if needed. OT reported patient does not want to use shower chair and prefers to stand. Recommended wife provide SBA when patient doing shower at home. Patient able to bathe and dress self, do grooming in standing, and transfer to commode with modified independence. OT recommended patient do LE dressing seated. OT reported strength and coordination are good.   ST reported mild cognitive impairment. Patient requires Min cues to recall information after 15 minute delay. Patient has moderate difficulty with attention to details and requires min-mod cues for reasoning and logic puzzles. ST reported patient does well with stating solutions to problems. ST did medication task with patient this morning. Patient needed cues to slow down doing tasks as this causes errors. Decreased attention also contributes to errors. ST recommended patient have supervision at home and wife to manage medications and finances.   NSG reviewed medications. Recommended patient monitor blood pressure at home. Discussed follow up with PCP and Dr. Torres, neurosurgeon (their office to call patient to schedule f/u appointment and MRI).   Team discussed no driving until okayed by doctors.   Outpatient ST recommended. No further PT or OT recommended. Patient and wife prefer to  come back here to [facility] for this.   No equipment to be ordered. Patient does not need any assist device for walking. Did discuss installing grab bars in shower if does not want to use shower chair.  Discussed no smoking and risks of continuing to smoke. Patient stated he intends to quit smoking as advised by doctors even before this hospitalization.   Recommended to wife to be home with patient for safety.   Patient made modified independent in room today. Patient scheduled to d/c home with wife tomorrow, 4/17.   Patient scheduled [facility] with start date on Tuesday, 4/20 at 10:15 a.m. Schedule will be Tuesdays and Thursdays at 10:15 a.m. Left message for wife regarding schedule and start date. Will give schedule to patient as well.

## 2021-04-16 NOTE — PLAN OF CARE
Goal Outcome Evaluation:         Slept well. Meds whole with water. Continent of B&B. No c/o pain. D/C plans tomorrow Sat. 4-17. Ambulates well. Doesn't always use call bell for help, impulsive, but oriented.

## 2021-04-16 NOTE — PROGRESS NOTES
SECTION GG      Self Care Performance Discharge:   Oral Hygiene: Patient completed the activities by him/herself with no  assistance from a helper.   Toileting Hygiene: : Patient completed the activities by him/herself with no  assistance from a helper.   Shower/Bathe Self: [place] provides verbal cues and/or touching/steadying and/or  contact guard assistance as patient completes activity.   Upper Body Dressing: Patient completed the activities by him/herself with no  assistance from a helper.   Lower Body Dressing: Patient completed the activities by him/herself with no  assistance from a helper.   Putting On/Taking Off Footwear: Patient completed the activities by him/herself  with no assistance from a helper.    Mobility Toilet Transfer Discharge: Patient completed the activities by  him/herself with no assistance from a helper.    Signed by: Shanti Morales OTR/MARINA

## 2021-04-16 NOTE — THERAPY DISCHARGE NOTE
Inpatient Rehabilitation - Physical Therapy Treatment Note/Discharge  [facility]     Patient Name: Chad Hansen  : 1950  MRN: [id number]  Today's Date: 2021                Admit Date: 2021    Visit Dx:  No diagnosis found.  Patient Active Problem List   Diagnosis   • Nontraumatic acute cerebral hemorrhage (CMS/HCC)   • Hypertension   • Irregular heartbeat     Past Medical History:   Diagnosis Date   • Arthritis    • Asthma    • Elevated cholesterol    • Hypertension    • Stroke (CMS/HCC)      Past Surgical History:   Procedure Laterality Date   • ABDOMINAL SURGERY     • APPENDECTOMY     • COLONOSCOPY     • EYE SURGERY     • FRACTURE SURGERY     • TONSILLECTOMY            PT ASSESSMENT (last 12 hours)      IRF PT Evaluation and Treatment     Row Name 2134          PT Time and Intention    Document Type  daily treatment;discharge evaluation  -LB     Mode of Treatment  physical therapy  -LB     Patient/Family/Caregiver Comments/Observations  pt in bed, states he slept last night and does not have a headache today  -LB     Row Name 21 0934          General Information    General Observations of Patient  pt in bed, resting comfortable  -LB     Row Name 2134          Cognition/Psychosocial    Follows Commands (Cognition)  follows multi-step commands  -LB     Personal Safety Interventions  fall prevention program maintained;gait belt;muscle strengthening facilitated;nonskid shoes/slippers when out of bed  -LB     Row Name 21 0934          Pain Scale: Numbers Pre/Post-Treatment    Pretreatment Pain Rating  0/10 - no pain  -LB     Row Name 2134          Bed Mobility    Rolling Left [place] (Bed Mobility)  independent  -LB     Rolling Right [place] (Bed Mobility)  independent  -LB     Supine-Sit [place] (Bed Mobility)  independent  -LB     Sit-Supine [place] (Bed Mobility)  not tested  -LB     Row Name 21 0934          Transfer  Assessment/Treatment    Comment (Transfers)  Performed sit to stand from surface around 15 inches from floor without UE assist indep - times 5 reps  -LB     Row Name 04/16/21 0934          Transfers    Sit-Stand [place] (Transfers)  independent  -LB     Stand-Sit [place] (Transfers)  independent  -LB     Row Name 04/16/21 0934          Car Transfer    [place] Level (Car Transfer)  independent  -LB     Row Name 04/16/21 0934          Gait/Stairs (Locomotion)    [place] Level (Gait)  independent  -LB     Distance in Feet (Gait)  800; 250; 80  -LB     Pattern (Gait)  swing-through  -LB     [place] Level (Stairs)  independent  -LB     Handrail Location (Stairs)  both sides  -LB     Number of Steps (Stairs)  20  -LB     Ascending Technique (Stairs)  step-over-step  -LB     Descending Technique (Stairs)  step-over-step  -LB     Comment (Gait/Stairs)  No LOB noted with gait activities that included outside on different surfaces   -LB     Row Name 04/16/21 0934          Curb Negotiation (Mobility)    [place], Curb Negotiation  independent  -LB     Row Name 04/16/21 0934          Rough/Uneven Surface Gait Skills (Mobility)    [place], Gait on Rough/Uneven Surface (Mobility)  independent  -LB     Distance in Feet (Rough/Uneven Surface Gait)  500  -LB     Comment, Gait Rough/Uneven Surface (Mobility)  ambulated outside on different surfaces including grass  -LB     Row Name 04/16/21 0934          Balance    Balance Interventions  sit to stand;narrowed base of support;vision occluded activity with distance supervision  -LB     Comment, Balance  Performed reaching, weight shifting and stepping activity indendently.  Pt picked up multiple bean bags from the floor indep.    -LB     Row Name 04/16/21 0934          Transfer Goal 1 (PT-IRF)    Activity/Assistive Device (Transfer Goal 1, PT-IRF)  sit-to-stand/stand-to-sit;bed-to-chair/chair-to-bed  -LB     [place] Level (Transfer Goal 1, PT-IRF)   independent  -LB     Progress/Outcomes (Transfer Goal 1, PT-IRF)  goal met  -LB     Row Name 04/16/21 0934          Transfer Goal 2 (PT-IRF)    Activity/Assistive Device (Transfer Goal 2, PT-IRF)  car transfer  -LB     [place] Level (Transfer Goal 2, PT-IRF)  independent  -LB     Progress/Outcomes (Transfer Goal 2, PT-IRF)  goal met  -LB     Row Name 04/16/21 0934          Gait/Walking Locomotion Goal 1 (PT-IRF)    Gait/Walking Locomotion Distance Goal 1 (PT-IRF)  500  -LB     [place] Level (Gait/Walking Locomotion Goal 1, PT-IRF)  independent  -LB     Progress/Outcomes (Gait/Walking Locomotion Goal 1, PT-IRF)  goal met  -LB     Row Name 04/16/21 0934          Stairs Goal 1 (PT-IRF)    Activity/Assistive Device (Stairs Goal 1, PT-IRF)  ascending stairs;descending stairs  -LB     Number of Stairs (Stairs Goal 1, PT-IRF)  12  -LB     [place] Level (Stairs Goal 1, PT-IRF)  set-up required  -LB     Progress/Outcomes (Stairs Goal 1, PT-IRF)  goal met  -LB     Row Name 04/16/21 0934          Balance Goal (PT)    Balance Goal (PT)  Improve Rodriguez balance score to 53/56  -LB     Progress/Outcome (Balance Goal, PT)  goal met  -LB     Row Name 04/16/21 0934          Positioning and Restraints    Pre-Treatment Position  sitting in chair/recliner  -LB     Post Treatment Position  chair  -LB     In Chair  call light within reach  -LB       User Key  (r) = Recorded By, (t) = Taken By, (c) = Cosigned By    Initials Name Provider Type    Carrie Trinidad PT Physical Therapist          Physical Therapy Education                 Title: PT OT SLP Therapies (In Progress)     Topic: Physical Therapy (Resolved)     Point: Mobility training (Resolved)     Learning Progress Summary           Patient Acceptance, E,TB, VU by TITO at 4/16/2021 1027    Comment: Pt doing well, only need supervision    Acceptance, E,TB, VU,NR by TITO at 4/14/2021 0909                   Point: Precautions (Resolved)     Learning Progress Summary            Patient Acceptance, E,TB, VU by LB at 4/16/2021 1027    Comment: Pt doing well, only need supervision    Acceptance, E,TB, VU,NR by LB at 4/15/2021 1034    Comment: The need to slow down for safety    Acceptance, E,TB, VU,NR by LB at 4/14/2021 0909                               User Key     Initials Effective Dates Name Provider Type Discipline    LB 04/03/18 -  Carrie Israel, PT Physical Therapist PT                PT Recommendation and Plan  Planned Therapy Interventions (PT): balance training, gait training, strengthening, transfer training, stair training          Outcome Measures     Row Name 04/16/21 0900 04/14/21 1400          Rodriguez Balance Scale    Sitting to Standing  4  -LB  4  -LB     Standing Unsupported  4  -LB  4  -LB     Sitting with Back Unsupported but Feet Supported on Floor or on Stool  4  -LB  4  -LB     Standing to Sitting  4  -LB  4  -LB     Transfers  4  -LB  4  -LB     Standing Unsupported with Eyes Closed  4  -LB  4  -LB     Standing Unsupported with Feet Together  4  -LB  4  -LB     Reaching Forward with Outstretched Arm While Standing  4  -LB  4  -LB      Object From the Floor From a Standing Position  4  -LB  3  -LB     Turning to Look Behind Over Left and Right Shoulders While Standing  4  -LB  3  -LB     Turn 360 Degrees  4  -LB  4  -LB     Place Alternate Foot on Step or Stool While Standing Unsupported  4  -LB  4  -LB     Standing Unsupported with One Foot in Front  3  -LB  3  -LB     Standing on One Leg  3  -LB  3  -LB     Rodriguez Total Score  54  -LB  52  -LB     Rodriguez Comments  56/56 - low risk for falls  -LB  52/56- low risk for falls  -LB        Functional Assessment    Outcome Measure Options  Rodriguez Balance  -LB  Rodriguez Balance  -LB       User Key  (r) = Recorded By, (t) = Taken By, (c) = Cosigned By    Initials Name Provider Type    LB Carrie Israel, PT Physical Therapist           Time Calculation:   PT Charges     Row Name 04/16/21 1030             Time Calculation    Start  Time  0900  -LB      Stop Time  1000  -LB      Time Calculation (min)  60 min  -LB      PT Received On  04/16/21  -LB        User Key  (r) = Recorded By, (t) = Taken By, (c) = Cosigned By    Initials Name Provider Type    Carrie Trinidad PT Physical Therapist          Therapy Charges for Today     Code Description Service Date Service Provider Modifiers Qty    72451140387 HC PT NEUROMUSC RE EDUCATION EA 15 MIN 4/16/2021 Carrie Israel, PT GP 3    88098087602 HC PT THER PROC EA 15 MIN 4/16/2021 Carrie Israel, PT GP 1        Patient was wearing a face mask during this therapy encounter. Therapist used appropriate personal protective equipment including eye protection, mask, and gloves.  Mask used was standard procedure mask. Appropriate PPE was worn during the entire therapy session. Hand hygiene was completed before and after therapy session. Patient is not in enhanced droplet precautions.       PT G-Codes  Outcome Measure Options: Rodriguez Balance  Rodriguez Total Score: 54    PT Discharge Summary  Reason for Discharge: All goals achieved, Discharge from facility  Outcomes Achieved: Able to achieve all goals within established timeline  Discharge Destination: Home with assist    Carrie Israel, ALEX  4/16/2021

## 2021-04-16 NOTE — PLAN OF CARE
Goal Outcome Evaluation:  Plan of Care Reviewed With: patient     Outcome Summary: Mr Lo has had a great day and is now independent in room. He is scheduled for discharge tomorrow, family conference held today, and medicaiton and follow up appointments reviewed. He is continent, takes medication with water, and no complaints of pain. He is alert and oriented x4, but forgetful and can be impulsive, but happy to now be independent.

## 2021-04-16 NOTE — THERAPY DISCHARGE NOTE
Inpatient Rehabilitation - IRF Occupational Therapy Treatment Note/Discharge  [facility]     Patient Name: Chad Hansen  : 1950  MRN: [id number]  Today's Date: 2021               Admit Date: 2021     No diagnosis found.  Patient Active Problem List   Diagnosis   • Nontraumatic acute cerebral hemorrhage (CMS/HCC)   • Hypertension   • Irregular heartbeat     Past Medical History:   Diagnosis Date   • Arthritis    • Asthma    • Elevated cholesterol    • Hypertension    • Stroke (CMS/HCC)      Past Surgical History:   Procedure Laterality Date   • ABDOMINAL SURGERY     • APPENDECTOMY     • COLONOSCOPY     • EYE SURGERY     • FRACTURE SURGERY     • TONSILLECTOMY            IRF OT ASSESSMENT FLOWSHEET (last 12 hours)      IRF OT Evaluation and Treatment     Row Name 21 1400          OT Time and Intention    Document Type  daily treatment;discharge evaluation  -SG     Mode of Treatment  occupational therapy  -SG     Patient Effort  good  -SG     Symptoms Noted During/After Treatment  none  -SG     Row Name 21 1400          General Information    Patient/Family/Caregiver Comments/Observations  Pt sitting up in chair in am, EOB with SLP in pm  -SG     Row Name 21 1400          Cognition/Psychosocial    Comment, Cognitive Interventions  Attempt pill sorting task, max difficulty with 1 step organization. Rec full supervision with pill sorting at home.  -SG     Row Name 21 1400          Pain Scale: Numbers Pre/Post-Treatment    Pretreatment Pain Rating  0/10 - no pain  -SG     Row Name 21 1400          Hand  Strength Testing    Left Hand, Setting 2 (Dynamometer Testing)  77  -SG     Right Hand, Setting 2 (Dynamometer Testing)  76  -SG     Left Hand: Tip (Pincer) Pinch Strength (Pinch Dynamometer Testing)  13  -SG     Left Hand: Lateral (Key) Pinch Strength (Pinch Dynamometer Testing)  17  -SG     Right Hand: Tip (Pincer) Pinch Strength (Pinch Dynamometer Testing)  15  -SG      Right Hand: Lateral (Key) Pinch Strength (Pinch Dynamometer Testing)  22  -     Row Name 04/16/21 1400          Functional Mobility    Functional Mobility- Ind. Level  conditional independence  -SG     Functional Mobility- Comment  Walks to shower room, therapy in gym  -[facility] Name 04/16/21 1400          Transfer Assessment/Treatment    Transfers  sit-stand transfer;stand-sit transfer;shower transfer  -     Row Name 04/16/21 1400          Transfers    Sit-Stand [place] (Transfers)  independent  -SG     Stand-Sit [place] (Transfers)  independent  -SG     [place] Level (Shower Transfer)  supervision  -SG     Assistive Device (Shower Transfer)  tub bench  -     Row Name 04/16/21 1400          Shower Transfer    Type (Shower Transfer)  stand pivot/stand step  -[facility] Name 04/16/21 1400          Motor Skills    Results, 9 Hole Peg Test of Fine Motor Coordination  BOX AND BLOCKS R66 L66; 9 HOLE PEG R22 L25  -[facility] Name 04/16/21 1400          Bathing    [place] Level (Bathing)  bathing skills;supervision  -     Assistive Device (Bathing)  grab bar/tub rail;hand held shower spray hose;shower chair  -SG     Position (Bathing)  supported sitting;supported standing  -SG     Comment (Bathing)  Vcing for safety   -[facility] Name 04/16/21 1400          Upper Body Dressing    [place] Level (Upper Body Dressing)  upper body dressing skills;modified independence  -SG     Comment (Upper Body Dressing)  Retrieves clothing from closet  -[facility] Name 04/16/21 1400          Lower Body Dressing    [place] Level (Lower Body Dressing)  doff;don;pants/bottoms;shoes/slippers;socks;underwear;modified independence  -SG     Position (Lower Body Dressing)  supported sitting;supported standing  -SG     Comment (Lower Body Dressing)  Cues to stay seated  -     Row Name 04/16/21 1400          Grooming    [place] Level (Grooming)  grooming skills;modified independence  -SG     Position  (Grooming)  sink side;supported standing  -     Row Name 04/16/21 1400          Toileting    [place] Level (Toileting)  toileting skills;modified independence  -SG     Assistive Device Use (Toileting)  grab bar/safety frame  -SG     Row Name 04/16/21 1400          Transfer Goal 1 (OT-IRF)    Activity/Assistive Device (Transfer Goal 1, OT-IRF)  toilet  -SG     [place] Level (Transfer Goal 1, OT-IRF)  independent  -SG     Time Frame (Transfer Goal 1, OT-IRF)  long-term goal (LTG);by discharge  -SG     Progress/Outcomes (Transfer Goal 1, OT-IRF)  goal met  -SG     Row Name 04/16/21 1400          Transfer Goal 2 (OT-IRF)    Activity/Assistive Device (Transfer Goal 2, OT-IRF)  walk-in shower  -SG     [place] Level (Transfer Goal 2, OT-IRF)  independent  -SG     Time Frame (Transfer Goal 2, OT-IRF)  long-term goal (LTG);by discharge  -SG     Progress/Outcomes (Transfer Goal 2, OT-IRF)  goal not met  -SG     Row Name 04/16/21 1400          Bathing Goal 1 (OT-IRF)    Activity/Device (Bathing Goal 1, OT-IRF)  bathing skills, all  -SG     [place] Level (Bathing Goal 1, OT-IRF)  independent  -SG     Time Frame (Bathing Goal 1, OT-IRF)  long-term goal (LTG);by discharge  -SG     Progress/Outcomes (Bathing Goal 1, OT-IRF)  goal not met  -     Row Name 04/16/21 1400          Toileting Goal 1 (OT-IRF)    Activity/Device (Toileting Goal 1, OT-IRF)  toileting skills, all  -SG     [place] Level (Toileting Goal 1, OT-IRF)  independent  -SG     Progress/Outcomes (Toileting Goal 1, OT-IRF)  goal met  -SG     Time Frame (Toileting Goal 1, OT-IRF)  long-term goal (LTG);by discharge  -SG     Row Name 04/16/21 1400          Balance Goal 1 (OT)    Activity/Assistive Device (Balance Goal 1, OT)  standing, static;standing, dynamic  -SG     [place] Level/Cues Needed (Balance Goal 1, OT)  independent  -SG     Time Frame (Balance Goal 1, OT)  long term goal (LTG);by discharge  -SG     Progress/Outcomes (Balance  Goal 1, OT)  goal met  -SG     Row Name 04/16/21 1400          Caregiver Training Goal 1 (OT-IRF)    Caregiver Training Goal 1 (OT-IRF)  Pt and family to be indep with ADLs, functional transfers and AD/AE needed for safe d/c home.  -SG     Time Frame (Caregiver Training Goal 1, OT-IRF)  long-term goal (LTG);by discharge  -SG     Progress/Outcomes (Caregiver Training Goal 1, OT-IRF)  goal met  -SG     Row Name 04/16/21 1400          Safety Awareness Goal 1 (OT-IRF)    Activity (Safety Awareness Goal 1, OT-IRF)  insight into deficits/self-awareness;judgment  -SG     [place]/Cues/Accuracy (Safety Awareness Goal 1, OT-IRF)  independent  -SG     Time Frame (Safety Awareness Goal 1, OT-IRF)  long-term goal (LTG);by discharge  -SG     Progress/Outcomes (Safety Awareness Goal 1, OT-IRF)  goal not met Requires vcing during bathing  -SG       User Key  (r) = Recorded By, (t) = Taken By, (c) = Cosigned By    Initials Name Effective Dates    Shanti Grossman, OTR 12/26/18 -              Occupational Therapy Education                 Title: PT OT SLP Therapies (In Progress)     Topic: Occupational Therapy (In Progress)     Point: ADL training (Done)     Description:   Instruct learner(s) on proper safety adaptation and remediation techniques during self care or transfers.   Instruct in proper use of assistive devices.              Learning Progress Summary           Patient Acceptance, E,TB, VU by  at 4/16/2021 1363    Comment: Spouse present for fam conf; pt continues with impulsivity, requires vcing during ADLs for safety. Rec shower chair but pt does not want, rec supervision while bathing for safety. No further OT. Discussed pt needs to be cleared by MD before driving.    Acceptance, E,TB, VU by SG at 4/14/2021 1227    Comment: OT role and goals, safety concerns   Significant Other Acceptance, E,TB, VU by  at 4/16/2021 1424    Comment: Spouse present for fam conf; pt continues with impulsivity, requires vcing  during ADLs for safety. Rec shower chair but pt does not want, rec supervision while bathing for safety. No further OT. Discussed pt needs to be cleared by MD before driving.                   Point: Home exercise program (Not Started)     Description:   Instruct learner(s) on appropriate technique for monitoring, assisting and/or progressing therapeutic exercises/activities.              Learner Progress:  Not documented in this visit.          Point: Precautions (Not Started)     Description:   Instruct learner(s) on prescribed precautions during self-care and functional transfers.              Learner Progress:  Not documented in this visit.          Point: Body mechanics (Not Started)     Description:   Instruct learner(s) on proper positioning and spine alignment during self-care, functional mobility activities and/or exercises.              Learner Progress:  Not documented in this visit.                      User Key     Initials Effective Dates Name Provider Type Discipline     12/26/18 -  Shanti Morales OTR Occupational Therapist OT                OT Recommendation and Plan  Anticipated Discharge Disposition (OT): home          OT IRF GOALS     Row Name 04/16/21 1400 04/14/21 1203          Transfer Goal 1 (OT-IRF)    Activity/Assistive Device (Transfer Goal 1, OT-IRF)  toilet  -SG  toilet  -SG     [place] Level (Transfer Goal 1, OT-IRF)  independent  -SG  independent  -SG     Time Frame (Transfer Goal 1, OT-IRF)  long-term goal (LTG);by discharge  -SG  long-term goal (LTG);by discharge  -SG     Progress/Outcomes (Transfer Goal 1, OT-IRF)  goal met  -SG  goal ongoing  -SG        Transfer Goal 2 (OT-IRF)    Activity/Assistive Device (Transfer Goal 2, OT-IRF)  walk-in shower  -SG  walk-in shower  -SG     [place] Level (Transfer Goal 2, OT-IRF)  independent  -SG  independent  -SG     Time Frame (Transfer Goal 2, OT-IRF)  long-term goal (LTG);by discharge  -SG  long-term goal (LTG);by discharge   -SG     Progress/Outcomes (Transfer Goal 2, OT-IRF)  goal not met  -SG  goal ongoing  -SG        Bathing Goal 1 (OT-IRF)    Activity/Device (Bathing Goal 1, OT-IRF)  bathing skills, all  -SG  bathing skills, all  -SG     [place] Level (Bathing Goal 1, OT-IRF)  independent  -SG  independent  -SG     Time Frame (Bathing Goal 1, OT-IRF)  long-term goal (LTG);by discharge  -SG  long-term goal (LTG);by discharge  -SG     Progress/Outcomes (Bathing Goal 1, OT-IRF)  goal not met  -SG  goal ongoing  -SG        Toileting Goal 1 (OT-IRF)    Activity/Device (Toileting Goal 1, OT-IRF)  toileting skills, all  -SG  toileting skills, all  -SG     [place] Level (Toileting Goal 1, OT-IRF)  independent  -SG  independent  -SG     Progress/Outcomes (Toileting Goal 1, OT-IRF)  goal met  -SG  goal ongoing  -SG     Time Frame (Toileting Goal 1, OT-IRF)  long-term goal (LTG);by discharge  -SG  long-term goal (LTG);by discharge  -SG        Balance Goal 1 (OT)    Activity/Assistive Device (Balance Goal 1, OT)  standing, static;standing, dynamic  -SG  standing, static;standing, dynamic  -SG     [place] Level/Cues Needed (Balance Goal 1, OT)  independent  -SG  independent  -SG     Time Frame (Balance Goal 1, OT)  long term goal (LTG);by discharge  -SG  long term goal (LTG);by discharge  -SG     Progress/Outcomes (Balance Goal 1, OT)  goal met  -SG  goal ongoing  -SG        Caregiver Training Goal 1 (OT-IRF)    Caregiver Training Goal 1 (OT-IRF)  Pt and family to be indep with ADLs, functional transfers and AD/AE needed for safe d/c home.  -SG  Pt and family to be indep with ADLs, functional transfers and AD/AE needed for safe d/c home.  -SG     Time Frame (Caregiver Training Goal 1, OT-IRF)  long-term goal (LTG);by discharge  -SG  long-term goal (LTG);by discharge  -SG     Progress/Outcomes (Caregiver Training Goal 1, OT-IRF)  goal met  -SG  goal ongoing  -SG        Safety Awareness Goal 1 (OT-IRF)    Activity (Safety Awareness  Goal 1, OT-IRF)  insight into deficits/self-awareness;judgment  -SG  insight into deficits/self-awareness;judgment  -SG     [place]/Cues/Accuracy (Safety Awareness Goal 1, OT-IRF)  independent  -SG  independent  -SG     Time Frame (Safety Awareness Goal 1, OT-IRF)  long-term goal (LTG);by discharge  -SG  long-term goal (LTG);by discharge  -SG     Progress/Outcomes (Safety Awareness Goal 1, OT-IRF)  goal not met Requires vcing during bathing  -SG  goal ongoing  -SG       User Key  (r) = Recorded By, (t) = Taken By, (c) = Cosigned By    Initials Name Provider Type    Shanti Grossman OTR Occupational Therapist          Outcome Measures     Row Name 04/16/21 0900 04/14/21 1400          Rodriguez Balance Scale    Sitting to Standing  4  -LB  4  -LB     Standing Unsupported  4  -LB  4  -LB     Sitting with Back Unsupported but Feet Supported on Floor or on Stool  4  -LB  4  -LB     Standing to Sitting  4  -LB  4  -LB     Transfers  4  -LB  4  -LB     Standing Unsupported with Eyes Closed  4  -LB  4  -LB     Standing Unsupported with Feet Together  4  -LB  4  -LB     Reaching Forward with Outstretched Arm While Standing  4  -LB  4  -LB      Object From the Floor From a Standing Position  4  -LB  3  -LB     Turning to Look Behind Over Left and Right Shoulders While Standing  4  -LB  3  -LB     Turn 360 Degrees  4  -LB  4  -LB     Place Alternate Foot on Step or Stool While Standing Unsupported  4  -LB  4  -LB     Standing Unsupported with One Foot in Front  3  -LB  3  -LB     Standing on One Leg  3  -LB  3  -LB     Rodriguez Total Score  54  -LB  52  -LB     Rodriguez Comments  56/56 - low risk for falls  -LB  52/56- low risk for falls  -LB        Functional Assessment    Outcome Measure Options  Rodriguez Balance  -LB  Rodriguez Balance  -LB       User Key  (r) = Recorded By, (t) = Taken By, (c) = Cosigned By    Initials Name Provider Type    Carrie Trinidad, PT Physical Therapist          Time Calculation:   Time Calculation- OT      Row Name 04/16/21 1533 04/16/21 1532          Time Calculation- OT    OT Start Time  1400  -  1015  -     OT Stop Time  1430  -  1045  -     OT Time Calculation (min)  30 min  -  30 min  -     OT Received On  04/16/21  -SG  04/16/21  -       User Key  (r) = Recorded By, (t) = Taken By, (c) = Cosigned By    Initials Name Provider Type    Shanti Grossman OTR Occupational Therapist          Therapy Charges for Today     Code Description Service Date Service Provider Modifiers Qty    64587590985 HC OT SELF CARE/MGMT/TRAIN EA 15 MIN 4/15/2021 Shanti Morales OTR GO 2    45516966902 HC OT SELF CARE/MGMT/TRAIN EA 15 MIN 4/16/2021 Shanti Morales OTR GO 3    12417384484 HC OT NEUROMUSC RE EDUCATION EA 15 MIN 4/16/2021 Shanti Morales OTR GO 1               OT Discharge Summary  Anticipated Discharge Disposition (OT): home  Reason for Discharge: Discharge from facility  Outcomes Achieved: Patient able to partially acheive established goals  Discharge Destination: Home with assist    JM Torres  4/16/2021

## 2021-04-17 VITALS
HEART RATE: 81 BPM | RESPIRATION RATE: 20 BRPM | BODY MASS INDEX: 20.91 KG/M2 | OXYGEN SATURATION: 100 % | SYSTOLIC BLOOD PRESSURE: 99 MMHG | WEIGHT: 162.92 LBS | HEIGHT: 74 IN | TEMPERATURE: 98.2 F | DIASTOLIC BLOOD PRESSURE: 57 MMHG

## 2021-04-17 PROCEDURE — 94799 UNLISTED PULMONARY SVC/PX: CPT

## 2021-04-17 RX ADMIN — FLUTICASONE PROPIONATE 1 SPRAY: 50 SPRAY, METERED NASAL at 08:12

## 2021-04-17 RX ADMIN — BUDESONIDE AND FORMOTEROL FUMARATE DIHYDRATE 2 PUFF: 160; 4.5 AEROSOL RESPIRATORY (INHALATION) at 06:44

## 2021-04-17 RX ADMIN — LISINOPRIL 40 MG: 40 TABLET ORAL at 08:12

## 2021-04-17 RX ADMIN — METOPROLOL TARTRATE 100 MG: 50 TABLET, FILM COATED ORAL at 08:11

## 2021-04-17 NOTE — PROGRESS NOTES
Name: Chad Hansen ADMIT: 2021   : 1950  PCP: Marianna Billings APRN    MRN: [id number] LOS: 4 days   AGE/SEX: 71 y.o. male  ROOM: [facility]/     Subjective   Subjective   He denies any chest pain, SOA, nausea, vomiting or diarrhea.     Review of Systems     Objective   Objective   Vital Signs  Temp:  [97.4 °F (36.3 °C)-98.4 °F (36.9 °C)] 98.4 °F (36.9 °C)  Heart Rate:  [70-95] 95  Resp:  [16-20] 20  BP: (122-149)/(64-71) 130/71  SpO2:  [97 %-100 %] 97 %  on   ;   Device (Oxygen Therapy): room air  Body mass index is 20.92 kg/m².  Physical Exam  Constitutional:       General: He is not in acute distress.     Appearance: He is not diaphoretic.   Cardiovascular:      Rate and Rhythm: Normal rate and regular rhythm.      Heart sounds: No murmur heard.     Pulmonary:      Effort: Pulmonary effort is normal.      Breath sounds: Normal breath sounds.   Abdominal:      General: Bowel sounds are normal. There is no distension.      Palpations: Abdomen is soft.      Tenderness: There is no abdominal tenderness.   Musculoskeletal:         General: Normal range of motion.   Skin:     General: Skin is warm and dry.   Neurological:      Mental Status: He is alert and oriented to person, place, and time.         Results Review     I reviewed the patient's new clinical results.  Results from last 7 days   Lab Units 21  1001   WBC 10*3/mm3 10.27   HEMOGLOBIN g/dL 12.8*   PLATELETS 10*3/mm3 255     Results from last 7 days   Lab Units 21  0648 21  1001   SODIUM mmol/L 138 138   POTASSIUM mmol/L 4.8 4.4   CHLORIDE mmol/L 103 102   CO2 mmol/L 27.8 29.0   BUN mg/dL 18 17   CREATININE mg/dL 1.04 1.02   GLUCOSE mg/dL 79 103*   Estimated Creatinine Clearance: 66.1 mL/min (by C-G formula based on SCr of 1.04 mg/dL).  Results from last 7 days   Lab Units 21  0648 21  1001   ALBUMIN g/dL 3.50 3.10*   BILIRUBIN mg/dL 0.2 0.3   ALK PHOS U/L 123* 131*   AST (SGOT) U/L 41* 68*   ALT (SGPT) U/L 115* 131*       Results from last 7 days   Lab Units 04/16/21  0648 04/14/21  1001   CALCIUM mg/dL 9.3 8.7   ALBUMIN g/dL 3.50 3.10*       SARS-CoV-2 PCR   Date Value Ref Range Status   04/13/2021 Not Detected Not Detected Final     Comment:     Nucleic acid specific to SARS-CoV-2 (COVID-19) virus was not detected in  this sample by the TaqPath (TM) COVID-19 Combo Kit.          SARS-CoV-2 (COVID-19) nucleic acid testing performed using [employer] Aptima (R) SARS-CoV-2 Assay or [employer] TaqPath (TM)  COVID-19 Combo Kit as indicated above under Emergency Use Authorization (EUA) from the FDA. Aptima (R) and TaqPath (TM) test performance  characteristics verified by [facility] in accordance with the FDAs Guidance Document (Policy for Diagnostic Tests for Coronavirus Disease-2019  during the Public Health Emergency) issued on March 16, 2020. The laboratory is regulated under CLIA as qualified to perform high-complexity testing  Unless otherwise noted, all testing was performed at [facility], CLIA No. [id number], [place] State Licensee No. [id number]     No results found for: HGBA1C, POCGLU    No image results found.    Scheduled Medications  amLODIPine, 5 mg, Oral, Daily Before Supper  budesonide-formoterol, 2 puff, Inhalation, BID - RT  fluticasone, 1 spray, Each Nare, Daily  lisinopril, 40 mg, Oral, Q24H  metoprolol tartrate, 100 mg, Oral, Q12H    Infusions   Diet  Diet Regular; Cardiac       Assessment/Plan     Active Hospital Problems    Diagnosis  POA   • **Nontraumatic acute cerebral hemorrhage (CMS/HCC) [I61.9]  Yes   • Hypertension [I10]  Yes   • Irregular heartbeat [I49.9]  Yes      Resolved Hospital Problems   No resolved problems to display.       71 y.o. male admitted with Nontraumatic acute cerebral hemorrhage (CMS/HCC).    Blood pressure seems at goal on current regimen.  Would continue amlodipine at discharge.  Instructed patient to check blood pressure at least twice daily and record and journal to review  with PCP at follow-up appointment next week or by the following week.    EKG reviewed.  No arrhythmia.      Justen Correa MD  [facility]  04/17/21  09:16 EDT

## 2021-04-17 NOTE — THERAPY DISCHARGE NOTE
Inpatient Rehabilitation - IRF Speech Language Pathology /Discharge  [facility]     Patient Name: Chad Hansen  : 1950  MRN: [id number]  Today's Date: 2021         Admit Date: 2021    Visit Dx:   No diagnosis found.  Patient Active Problem List   Diagnosis   • Nontraumatic acute cerebral hemorrhage (CMS/HCC)   • Hypertension   • Irregular heartbeat          SLP GOALS     Row Name 21 1330 21 0900 21 0830       Attention Goal 1 (SLP)    Improve Attention by Goal 1 (SLP)  complete selective attention task;80%;independently (over 90% accuracy)  -AB  complete selective attention task;80%;independently (over 90% accuracy)  -AB  complete selective attention task;80%;independently (over 90% accuracy)  -AB    Progress/Outcomes (Attention Goal 1, SLP)  goal ongoing  -AB  goal ongoing  -AB  goal ongoing  -AB    Comment (Attention Goal 1, SLP)  2 step/4 component direction task:  (I);  w/MAX cues, breakdown of steps into 1 piece components, reduce visual distractions, require pt to read directions aloud, and repeat at least x2 for each component   -AB  --  --       Memory Skills Goal 1 (SLP)    Improve Memory Skills Through Goal 1 (SLP)  listen to a paragraph and answer questions;recall details of the day;80%;independently (over 90% accuracy)  -AB  listen to a paragraph and answer questions;recall details of the day;80%;independently (over 90% accuracy)  -AB  listen to a paragraph and answer questions;recall details of the day;80%;independently (over 90% accuracy)  -AB    Progress/Outcomes (Memory Skills Goal 1, SLP)  goal ongoing  -AB  goal ongoing  -AB  goal ongoing  -AB    Comment (Memory Skills Goal 1, SLP)  --  --  Recalled 2/3 errands after 20 min w/NO cues; 3/3 w/MIN cues  -AB       Organizational Skills Goal 1 (SLP)    Improve Thought Organization Through Goal 1 (SLP)  completing a divergent naming task;naming similarities and differences;80%;independently (over 90% accuracy)  " -AB  completing a divergent naming task;naming similarities and differences;80%;independently (over 90% accuracy)  -AB  completing a divergent naming task;naming similarities and differences;80%;independently (over 90% accuracy)  -AB    Progress/Outcomes (Thought Organization Skills Goal 1, SLP)  goal ongoing  -AB  --  goal ongoing  -AB       Reasoning Goal 1 (SLP)    Improve Reasoning Through Goal 1 (SLP)  complete deductive reasoning task;complete mental flexibility task;80%;independently (over 90% accuracy)  -AB  --  complete deductive reasoning task;complete mental flexibility task;80%;independently (over 90% accuracy)  -AB    Progress/Outcomes (Reasoning Goal 1, SLP)  goal ongoing  -AB  --  goal ongoing  -AB    Comment (Reasoning Goal 1, SLP)  deductive reasonin/8 (I); 3/8 max cues. stimuli included x2 portions, x6 additional component pieces of 2 step direction task. pt presents with profound difficulties within this task. examples include \"If a match burns, [place] items that are hot. If a match does not burn, underline items that are cold.\" Pt presents with tangential speech, makes unrelated connections from simuli to deduction such as \"Well I see match listed down there and it's hot and cold, so that must be the answer.\" Even in presence of maximal cues, pt only increases deductive reasoning x1 as he presents with concrete and unrelated thinking  -AB  --  --       Functional Math Skills Goal 1 (SLP)    Improve Functional Math Skills Through Goal 1 (SLP)  complete word problems involving time;complete word problems involving money;80%;independently (over 90% accuracy)  -AB  --  complete word problems involving time;complete word problems involving money;80%;independently (over 90% accuracy)  -AB    Progress/Outcomes (Functional Math Skills Goal 1, SLP)  goal ongoing  -AB  --  goal ongoing  -AB       Executive Functional Skills Goal 1 (SLP)    Improve Executive Function Skills Goal 1 (SLP)  demonstrate " "awareness of deficit;identify strategies, strengths, limitations;home management activity;80%;independently (over 90% accuracy)  -AB  --  demonstrate awareness of deficit;identify strategies, strengths, limitations;home management activity;80%;independently (over 90% accuracy)  -AB    Progress/Outcomes (Executive Function Skills Goal 1, SLP)  goal ongoing  -AB  --  goal ongoing  -AB    Comment (Executive Function Skills Goal 1, SLP)  --  --  med management task-initial estimate of meds/type taken rated at 5/9. SLP aided with visual organizer from printout. From this information, pt asked to characterize medicine as daily or as needed w/4/9 accuracy despite printout, color coded information provided. Errors appear secondary to impulsivity and pt perseverative on previous medications and dosage levels. Problem solving for meds (i.e. \"Why would you take a blood pressure medicine?\" w/4/6 accuracy MIN cues; 5/6 MAX cues. Comprehension from written directions and visual organizer-6/9, w/MIN to MOD cues, again appearing direct correlate to impulsivity.  -AB    Row Name 04/15/21 1330 04/15/21 0830 04/14/21 0830       Attention Goal 1 (SLP)    Improve Attention by Goal 1 (SLP)  --  complete selective attention task;80%;independently (over 90% accuracy)  -AL  complete selective attention task;80%;independently (over 90% accuracy)  -AL    Time Frame (Attention Goal 1, SLP)  --  --  1 week  -AL    Progress/Outcomes (Attention Goal 1, SLP)  --  good progress toward goal  -AL  --    Comment (Attention Goal 1, SLP)  --  Attention to detial for 2-step written directions: 50% with NO cues, 100% with MIN-MOD cues.  -AL  --       Memory Skills Goal 1 (SLP)    Improve Memory Skills Through Goal 1 (SLP)  listen to a paragraph and answer questions;recall details of the day;80%;independently (over 90% accuracy)  -AL  --  listen to a paragraph and answer questions;recall details of the day;80%;independently (over 90% accuracy)  -AL    Time " Frame (Memory Skills Goal 1, SLP)  --  --  1 week  -AL    Progress/Outcomes (Memory Skills Goal 1, SLP)  goal ongoing  -AL  --  --    Comment (Memory Skills Goal 1, SLP)  Pt required MAX cues to recall names of therapies. Recalled lunch meal with NO cues. Recalled 2/3 errands after 15 minutes with NO cues, 3/3 with MIN cues  -AL  --  --       Organizational Skills Goal 1 (SLP)    Improve Thought Organization Through Goal 1 (SLP)  --  completing a divergent naming task;naming similarities and differences;80%;independently (over 90% accuracy)  -AL  completing a divergent naming task;naming similarities and differences;80%;independently (over 90% accuracy)  -AL    Time Frame (Thought Organization Skills Goal 1, SLP)  --  --  1 week  -AL    Progress/Outcomes (Thought Organization Skills Goal 1, SLP)  --  good progress toward goal  -AL  --    Comment (Thought Organization Skills Goal 1, SLP)  --  Green items: 6 with NO cues, 10 with MIN cues  -AL  --       Reasoning Goal 1 (SLP)    Improve Reasoning Through Goal 1 (SLP)  complete deductive reasoning task;complete mental flexibility task;80%;independently (over 90% accuracy)  -AL  --  complete deductive reasoning task;complete mental flexibility task;80%;independently (over 90% accuracy)  -AL    Time Frame (Reasoning Goal 1, SLP)  --  --  1 week  -AL    Progress/Outcomes (Reasoning Goal 1, SLP)  good progress toward goal  -AL  --  --    Comment (Reasoning Goal 1, SLP)  Logic puzzle: 30% accurate with NO cues, 100% with MIN-MOD cues  -AL  --  --       Functional Math Skills Goal 1 (SLP)    Improve Functional Math Skills Through Goal 1 (SLP)  --  --  complete word problems involving time;complete word problems involving money;80%;independently (over 90% accuracy)  -AL    Time Frame (Functional Math Skills Goal 1, SLP)  --  --  1 week  -AL       Executive Functional Skills Goal 1 (SLP)    Improve Executive Function Skills Goal 1 (SLP)  --  --  demonstrate awareness of  deficit;identify strategies, strengths, limitations;home management activity;80%;independently (over 90% accuracy)  -AL    Time Frame (Executive Function Skills Goal 1, SLP)  --  --  1 week  -AL      User Key  (r) = Recorded By, (t) = Taken By, (c) = Cosigned By    Initials Name Provider Type    Mary Lou Villafuerte, MS CCC-SLP Speech and Language Pathologist    Janice Sanchez, MS CCC-SLP Speech and Language Pathologist          EDUCATION  The patient has been educated in the following areas:   Cognitive Impairment.    SLP Recommendation and Plan  Due to short length of inpatient stay, pt did not meet all short-term cognitive goals. At discharge, pt was performing at the following levels:    Cognitive Status: Pt presented with an overall mild cognitive-linguistic impairment. Pt was able to recall 2/3 errands after 15 minute delay with NO cues; MIN cues to increase. He exhibited improved memory for recall of daily activities (meals, therapy activities); however, he required MAX cues to recall names of therapies and therapists. He required MIN cues for pathfinding on the unit. Immediate memory for recall of a complex paragraph was moderately impaired on evaluation; goal was not addressed due to time constraints. Pt required MIN cues to complete abstract divergent tasks. Pt required overall MOD cues for attention to detail for bill pay and medicine management, and MAX cues for attention to detail for written 2-step directions. Pt required MIN-MOD cues to complete a moderately complex logic puzzle. Pt exhibited reduced deficit awareness and mild impulsivity.     Communication Status: Auditory comprehension and oral expression appeared WFL for complex conversation. Pt required occasional repetitions due to hearing impairment.    Swallow Status: Pt was tolerating regular with thins.    Caregiver Training: Pt and his wife participated in a Family Conference with , OT, PT, SLP and nursing prior to  discharge. SLP reviewed pt's progress toward cognitive goals, recommendations for 24 hour indirect supervision and assist for finance and medicine management, and recommendation for continued speech therapy with outpatient. Also, reviewed recommendations for no driving due to cognitive impairment. They voiced understanding, but need further education.         SLP Outcome Measures (last 72 hours)      SLP Outcome Measures     Row Name 04/17/21 0800 04/14/21 1500          SLP Outcome Measures    Outcome Measure Used?  Adult NOMS  -AL  Adult NOMS  -AL        Adult FCM Scores    FCM Chosen  Attention;Memory  -AL  Attention;Memory  -AL     Attention FCM Score  5  -AL  5  -AL     Memory FCM Score  5  -AL  5  -AL       User Key  (r) = Recorded By, (t) = Taken By, (c) = Cosigned By    Initials Name Effective Dates    Janice Sanchez, MS CCC-SLP 08/30/19 -             Time Calculation:                SLP Discharge Summary  Anticipated Discharge Disposition (SLP): home with OP services    Janice Morocho MS CCC-SLP  4/17/2021

## 2021-04-17 NOTE — PLAN OF CARE
Goal Outcome Evaluation:  Plan of Care Reviewed With: patient  Progress: improving  Outcome Summary: A&OX4. Forgetful. Calm, cooperative, and pleasant. Ate well at meals. Diet: Reg, healthy heart, thin liquids. Denied pain. Full code. NIH=0. NIH completed. Can walk on own well. No numbness or tingling. Continent B&B. Last BM 4/16. Independent in his room. Has PRN hydralazine. Keep systolic BP below 140s. Waiting to be discharged home today.

## 2021-04-17 NOTE — PROGRESS NOTES
SECTION GG    Eating Performance Discharge: Patient completed the activities by him/herself  with no assistance from a helper.    Signed by: Shayy Arnold RN

## 2021-04-17 NOTE — PLAN OF CARE
Problem: Rehabilitation (IRF) Plan of Care  Goal: Absence of New-Onset Illness or Injury  Outcome: Ongoing, Progressing     Problem: Rehabilitation (IRF) Plan of Care  Goal: Optimal Comfort and Wellbeing  Outcome: Ongoing, Progressing     Problem: Fall Injury Risk  Goal: Absence of Fall and Fall-Related Injury  Outcome: Ongoing, Progressing   Goal Outcome Evaluation:         Pt A/Ox4, independent in room. Meds taken whole w thin liquids. Pt continent using toilet. No c/o pain.

## 2021-04-19 ENCOUNTER — TRANSCRIBE ORDERS (OUTPATIENT)
Dept: PHYSICAL MEDICINE AND REHAB | Facility: HOSPITAL | Age: 71
End: 2021-04-19

## 2021-04-19 DIAGNOSIS — I61.9 INTRAPARENCHYMAL HEMORRHAGE OF BRAIN (HCC): Primary | ICD-10-CM

## 2021-04-20 ENCOUNTER — HOSPITAL ENCOUNTER (OUTPATIENT)
Dept: SPEECH THERAPY | Facility: HOSPITAL | Age: 71
Setting detail: THERAPIES SERIES
Discharge: HOME | End: 2021-04-20

## 2021-04-20 DIAGNOSIS — I61.9 NONTRAUMATIC ACUTE CEREBRAL HEMORRHAGE (HCC): ICD-10-CM

## 2021-04-20 DIAGNOSIS — I69.119 COGNITIVE DEFICITS FOLLOWING NONTRAUMATIC INTRACEREBRAL HEMORRHAGE: Primary | ICD-10-CM

## 2021-04-20 PROCEDURE — 96125 COGNITIVE TEST BY HC PRO: CPT

## 2021-04-20 NOTE — PROGRESS NOTES
PPS CMG Coordinator  Inpatient Rehabilitation Discharge    Mode of Locomotion: Walking.    Discharge Against Medical Advice:  No.  Discharge Information  Patient Discharged Alive:  Yes  Discharge Destination/Living Setting: Home.  At discharge, the patient was discharged to live (with) (02)  Family / Relatives    Diagnosis for Interruption/Death: ICD    Impairment Group: Brain Dysfunction: 02.1 Non-traumatic    Comorbidities: ICD    Complications: ICD    QUALITY INDICATORS  Section J Health Conditions: Fall(s) Since Admission:  No    Section M. Skin Conditions Discharge:  Unhealed Pressure Ulcer(s) at Stage 1 or  Higher:  No    . Current Number of Unhealed Pressure Ulcers  Branch    Section N. Medication:  Medication Intervention: N/A - There were no potential clinically significant  medication issues identified since admission or patient is not taking any  medications.    Signed by: Kt Villanueva RN

## 2021-04-20 NOTE — PROGRESS NOTES
PPS CMG Coordinator  Inpatient Rehabilitation Admission    Ethnic Group: White.  Marital Status:  Marital Status: .    IRF Admission Date:  04/13/2021  Admission Class: Initial Rehab.  Admit From:  Short-term [facility]    Pre-Hospital Living: Home. Pre-Hospital Living  With: (2) Family/Relatives.    Payment Sources: Primary: Medicare - Medicare Advantage  Secondary: Not Listed.  Impairment Group: 02.1 Non-traumatic  Date of Onset of Impairment: 04/05/2021    Etiologic Diagnosis Code(s):  Rank Code      Description  1    I61.1     Nontraumatic intracerebral hemorrhage in                 hemisphere, cortical    Comorbidities:      Are there any arthritis conditions recorded for Impairment Group, Etiologic  Diagnosis, or Comorbid Conditions that meet all of the regulatory requirements  for IRF classification (in 42 .29(b)(2)(x), (xi), and xii))? No    Presence of Pressure Ulcer:  No observed/documented pressure ulcers.    MEDICAL NEEDS  Height on Admission:  74 inches.  Weight on Admission:  163 pounds.    QUALITY INDICATORS  Prior Functioning:  Self Care: Patient completed the activities by him/herself, with or without an  assistive device, with no assistance from a helper.  Indoor Mobility: Patient completed the activities by him/herself, with or  without an assistive device, with no assistance from a helper.  Stairs: Patient completed the activities by him/herself, with or without an  assistive device, with no assistance from a helper.  Functional Cognition: Patient completed the activities by him/herself, with or  without an assistive device, with no assistance from a helper.  Prior Device Use: Patient does not use manual or motorized wheelchair or  scooter, mechanical lift, walker, or an orthotic/prosthesis.    Bladder and Bowel: Bladder Continence: Always continent (no documented  incontinence).  Bowel Continence: Always continent (no documented incontinence).  Swallowing/Nutritional Status:  Regular food (solids and liquids swallowed safely  without supervision or modified food or liquid consistency).  Special Conditions: Patient did not receive total parenteral nutrition treatment  at the time of admission.    Section I. Active Diagnosis: Comorbidities and Co-existing Conditions:   Patient  does not have PAD, PVD, or Diabetes Mellitus  Section J. Health Conditions: Patient has not had any falls in the past year.  Patient has not had major surgery during the 100 days prior to admission.  Section M. Skin Conditions  Unhealed Pressure Ulcer/Injuries at Stage 1 or  Higher on Admission:  No.  Section N. Medication:  Potential Clinically Significant Medication Issues: No issues found during  review    Signed by: Kt Villanueva RN

## 2021-04-20 NOTE — THERAPY EVALUATION
Outpatient Speech Language Pathology   Adult Speech Language Cognitive Initial Evaluation  [facility]     Patient Name: Chad Hansen  : 1950  MRN: [id number]  Today's Date: 2021        Visit Date: 2021   Patient Active Problem List   Diagnosis   • Nontraumatic acute cerebral hemorrhage (CMS/HCC)   • Hypertension   • Irregular heartbeat        Past Medical History:   Diagnosis Date   • Arthritis    • Asthma    • Elevated cholesterol    • Hypertension    • Stroke (CMS/HCC)         Past Surgical History:   Procedure Laterality Date   • ABDOMINAL SURGERY     • APPENDECTOMY     • COLONOSCOPY     • EYE SURGERY     • FRACTURE SURGERY     • TONSILLECTOMY       Patient was wearing a face mask during this therapy encounter. Therapist used appropriate personal protective equipment including mask, eye protection and gloves.  Mask used was standard procedure mask. Appropriate PPE was worn during the entire therapy session. Hand hygiene was completed before and after therapy session. Patient is not in enhanced droplet precautions.     Visit Dx:    ICD-10-CM ICD-9-CM   1. 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 Nontraumatic acute cerebral hemorrhage (CMS/HCC)  I61.9 431       Patient History     Row Name 21 1400             History    Chief Complaint  Other 1 (comment) Cognitive deficits s/p intraparenchymal hemorrhage  -HS      Date Current Problem(s) Began  21  -HS      Brief Description of Current Complaint  Right frontal intraparenchymal hemorrhage (4.5 x 1.7 cm) with surrounding edema and brain compression   -HS      Previous treatment for THIS PROBLEM  Rehabilitation 21-21  -HS      Patient/Caregiver Goals  Return to prior level of function  -HS      Patient/Caregiver Goals Comment  Return to driving  -HS      Hand Dominance  right-handed  -HS      Occupation/sports/leisure activities  Retire,   -HS      What clinical tests have  you had for this problem?  CT scan  -HS      Results of Clinical Tests  See above  -HS      Related/Recent Hospitalizations  Yes  -HS      Date of Hospitalization  04/05/21  -HS         Pain     Pain at Present  0  -HS         Fall Risk Assessment    Any falls in the past year:  No  -HS         Services    Prior Rehab/Home Health Experiences  Yes  -HS      When was the prior experience with Rehab/Home Health  4/13/21  -HS      Where was the prior experience with Rehab/Home Health  [facility]  -HS      Are you currently receiving Home Health services  No  -HS      Do you plan to receive Home Health services in the near future  No  -HS         Daily Activities    Primary Language  English  -HS      How does patient learn best?  Listening;Reading;Demonstration;Pictures/Video  -HS      Teaching needs identified  Home Exercise Program;Management of Condition  -HS      Does patient have problems with the following?  None  -HS      Barriers to learning  None  -HS      Pt Participated in POC and Goals  Yes  -HS         Safety    Are you being hurt, hit, or frightened by anyone at home or in your life?  No  -HS      Are you being neglected by a caregiver  No  -HS        User Key  (r) = Recorded By, (t) = Taken By, (c) = Cosigned By    Initials Name Provider Type    HS Aline Milton MS CCC-SLP Speech and Language Pathologist          SLP SLC Evaluation - 04/20/21 1400        Communication Assessment/Intervention    Document Type  evaluation   -HS    Subjective Information  no complaints   -HS    Patient Observations  alert;cooperative;agree to therapy   -HS    Patient/Family/Caregiver Comments/Observations  Wife accompanied patient to evaluation session, unable to sit in for session due to COVID precautions   -HS    Care Plan Review  evaluation/treatment results reviewed;care plan/treatment goals reviewed;risks/benefits reviewed;current/potential barriers reviewed;patient/other agree to care plan    -HS    Care Plan Review, Other Participant(s)  spouse;other (see comments)    at end of evaluation session  -HS    Patient Effort  good   -HS    Symptoms Noted During/After Treatment  none   -HS       General Information    Patient Profile Reviewed  yes   -HS    Pertinent History Of Current Problem  s/p Right intraparenchymal hemorrhage    -HS    Patient Level of Education  10th grade,    -HS    Prior Level of Function-Communication  WFL   -HS    Plans/Goals Discussed with  patient;spouse/S.O.   -HS    Barriers to Rehab  none identified   -HS    Patient's Goals for Discharge  return to all previous roles/activities   -HS    Family Goals for Discharge  patient able to return to previous activities/roles   -HS    Standardized Assessment Used  CLQT   -       Standardized Tests    Cognitive/Memory Tests  CLQT: Cognitive Linguistic Quick Test   -HS       CLQT (The Cognitive Linguistic Quick Test)    Attention Domain Score  189   -HS    Attention Severity Rating  4: WNL   -HS    Memory Domain Score  124   -HS    Memory Severity Rating  3: Mild   -HS    Executive Function Domain Score  28   -HS    Executive Function Severity Rating  4: WNL   -HS    Language Domain Score  26   -HS    Language Severity Rating  3: Mild   -HS    Visuospatial Domain Score  88   -HS    Visuospatial Severity Rating  4: WNL   -HS    Clock Drawing Total Score  11   -HS    Clock Drawing Severity Rating  WNL   -HS    Composite Severity Rating  3.6   -HS    Composite Severity Rating Range  4.0 - 3.5: WNL   -HS    CLQT Comments  The patient scored WNL overall on the CLQT, however he demonstrated mild deficits in the Language and Memory domains. Scores have improved since last administration 4/14/21 (see comparison table below). The patient scored below the criterion cut on the story retelling subtest indicating deficits with auditory recall and attention to detail. The patient demonstrates a flat affect, mild impulsivity, and has some  difficulty into insight/awareness of deficits. The patient's goals are to return to independence and driving. Recommend short-term SLP services to address high level cognition for safe return to home and community.    -HS       SLP Clinical Impressions    SLP Diagnosis  Mild cognitive communication deficits    -HS    Rehab Potential/Prognosis  good   -HS    [facility] Criteria for Skilled Therapy Interventions Met  yes   -HS    Functional Impact  difficulty completing home management task;needs occasional supervision   -HS       Recommendations    Therapy Frequency (SLP SLC)  2 days per week   -HS    Predicted Duration Therapy Intervention (Days)  3 weeks;4 weeks   -HS    Anticipated Discharge Disposition (SLP)  home   -HS      User Key  (r) = Recorded By, (t) = Taken By, (c) = Cosigned By    Initials Name Provider Type    HS Aline Milton MS CCC-SLP Speech and Language Pathologist        CLQT Comparison Scores Across Administrations      4/14/21 4/20/21   Attention   - 215 122 - Mild  189 - WNL   Memory   - 185 108 - Mod  124- Mild   Executive Functions  WNL 19 - 40 17 - Mild  28 - WNL   Language  WNL 28 - 37 25 - Mild  26- Mild   Visuospatial Skills  WNL 62 - 105 57 - Mild  88 - WNL   Clock Drawing  WNL 12 - 13 13 - WNL  11 - WNL   Composite Severity Rating  WNL 3.5 - 4.0 2.8 - Mild  3.6 - WNL       OP SLP Education     Row Name 04/20/21 4615       Education    Barriers to Learning  No barriers identified  -    Education Provided  Described results of evaluation;Patient expressed understanding of evaluation;Family/caregivers expressed understanding of evaluation;Patient participated in establishing goals and treatment plan;Family/caregivers participated in establishing goals and treatment plan  -    Assessed  Learning motivation;Learning preferences;Learning readiness;Learning needs  -    Learning Motivation  Strong  -    Learning Method  Explanation  -    Teaching Response  Verbalized  understanding  -HS      User Key  (r) = Recorded By, (t) = Taken By, (c) = Cosigned By    Initials Name Effective Dates    HS Karine Aline, MS CCC-SLP 06/08/18 -           SLP OP Goals     Row Name 04/20/21 1400          Goal Type Needed  Executive Function;Probelm Solving;Memory  -HS          Able to rate subjective pain?  yes  -HS    Pre-Treatment Pain Level  0  -HS    Post-Treatment Pain Level  0  -HS          Executive Function LTG's  Patient will be able to participate in the community safely  -HS    Patient will be able to participate in the community safely  without cues  -HS    Status: Patient will be able to participate in the community safely  New  -HS    Executive Function STG's  Patient will improve executive functioning skills by designing a self-instructional technique and/or choose a metacognitive strategy that will assist with completing novel tasks  -HS    Patient will improve executive functioning skills by designing a self-instructional technique and/or choose a metacognitive strategy that will assist with completing novel tasks  90%:;without cues  -HS    Status: Patient will improve executive functioning skills by designing a self-instructional technique and/or choose a metacognitive strategy that will assist with completing novel tasks  New  -HS          Memory LTG's  Patient will be able to remember information needed to participate in avocational activities  -HS    Status: Patient will be able to remember information needed to participate in avocational activities  New  -HS    Memory STG's  Patient will demonstrate improved ability to recall information by listening to paragraph and answering yes/no questions  -HS    Patient will demonstrate improved ability to recall information by listening to paragraph and answering yes/no questions  90%:;without cues  -HS    Status: Patient will demonstrate improved ability to recall information by listening to paragraph and answering yes/no questions   New  -HS          Problem Solving LTG's  Patient will be able to execute all activities necessary to manage a home  -HS    Patient will be able to execute all activities necessary to manage a home  Independently  -HS    Status: Patient will be able to execute all activities necessary to manage a home  New  -HS    Problem Solving STG's  Patient will improve ability to analyze problems and determine solutions by completing a visual representation/filling in a chart by following  written directions;Patient will improve ability to analyze problems and determine solutions by demonstrating ability to complete household/work management tasks in an organized approach  -HS    Patient will improve ability to analyze problems and determine solutions by demonstrating ability to complete household/work management tasks in an organized approach  90%:;without cues  -HS    Status: Patient will improve ability to analyze problems and determine solutions by demonstrating ability to complete household/work management tasks in an organized approach  New  -HS    Patient will improve ability to analyze problems and determine solutions by completing a visual representation/filling in a chart by following  written directions  90%:;without cues  -HS    Status: Patient will improve ability to analyze problems and determine solutions by completing a visual representation/filling in a chart by following  written directions  New  -HS          SLP Goal Re-Cert Due Date  07/20/21  -HS      User Key  (r) = Recorded By, (t) = Taken By, (c) = Cosigned By    Initials Name Provider Type    Aline Guerrero MS CCC-SLP Speech and Language Pathologist          OP SLP Assessment/Plan - 04/20/21 1449        SLP Assessment    Functional Problems  Speech Language- Adult/Cognition   -HS    Impact on Function: Adult Speech Language/Cognition  Requires supervision   -HS    Clinical Impression: Speech Language-Adult/Congnition  Mild:;Cognitive Communication  Impairment   -HS    Prognosis  Good (comment)    Motivated, good recovery to date, supportive spouse  -HS    Patient/caregiver participated in establishment of treatment plan and goals  Yes   -HS    Patient would benefit from skilled therapy intervention  Yes   -HS       SLP Plan    Frequency  2x/week   -HS    Duration  3-4 weeks   -HS    Planned CPT's?  SLP DEV COG SKILLS INITIAL (15 MIN) : 46481;SLP DEV COG SKILLS ADD (15 MIN) : 84948   -HS    Expected Duration of Therapy Session (SLP Eval)  45   -HS      User Key  (r) = Recorded By, (t) = Taken By, (c) = Cosigned By    Initials Name Provider Type    HS Aline Milton MS CCC-SLP Speech and Language Pathologist          Time Calculation:   SLP Start Time: 1015  SLP Stop Time: 1100  SLP Time Calculation (min): 45 min  Total Timed Code Minutes- SLP: 120 minute(s) (2671-5373; 3125-9511; 0471-9433)    Therapy Charges for Today     Code Description Service Date Service Provider Modifiers Qty    48343726815 HC ST STD COG PERF TEST PER HOUR 4/20/2021 Aline Milton MS CCC-SLP GN 2               Aline Milton MS CCC-SLP  4/20/2021

## 2021-04-20 NOTE — PAYOR COMM NOTE
"Good afternoon!    AUTH # [id number]    The patient listed below was discharged home Saturday, 4/17, with plans for outpatient therapies. If you have any questions please call.    Thank you!    Kt Villanueva RN  p   f     Sally Hansen (71 y.o. Male)     Date of Birth Social Security Number Address Home Phone MRN    1950  [address] [place] [address] [phone] [id number]    [employer] Marital Status          None        Admission Date Admission Type Admitting Provider Attending Provider Department, Room/Bed    4/13/21 Elective Nomi Ibarra MD  [facility], 4408/1    Discharge Date Discharge Disposition Discharge Destination        4/17/2021 Home or Self Care              Attending Provider: (none)   Allergies: Pravastatin, Hydrochlorothiazide    Isolation: None   Infection: None   Code Status: Not on file    Ht: 188 cm (74\")   Wt: 73.9 kg (162 lb 14.7 oz)    Admission Cmt: None   Principal Problem: Nontraumatic acute cerebral hemorrhage (CMS/HCC) [I61.9]                 Active Insurance as of 4/13/2021     Primary Coverage     Payor Plan Insurance Group Employer/Plan Group    HUMANA MEDICARE REPLACEMENT HU[employer]NA MEDICARE REPLACEMENT [id number]     Payor Plan Address Payor Plan Phone Number Payor Plan Fax Number Effective Dates    [address] [address] [phone]  1/1/2018 - None Entered    [facility] [address]       Subscriber Name Subscriber Birth Date Member ID       SALLY HANSEN 1950 [id number]                 Emergency Contacts      (Rel.) Home Phone Work Phone Mobile Phone    VIVIAN HANSEN (Spouse) [phone] -- [phone]              "

## 2021-04-22 ENCOUNTER — HOSPITAL ENCOUNTER (OUTPATIENT)
Dept: SPEECH THERAPY | Facility: HOSPITAL | Age: 71
Setting detail: THERAPIES SERIES
Discharge: HOME | End: 2021-04-22

## 2021-04-22 DIAGNOSIS — I61.9 NONTRAUMATIC ACUTE CEREBRAL HEMORRHAGE (HCC): ICD-10-CM

## 2021-04-22 DIAGNOSIS — I69.119 COGNITIVE DEFICITS FOLLOWING NONTRAUMATIC INTRACEREBRAL HEMORRHAGE: Primary | ICD-10-CM

## 2021-04-22 PROCEDURE — 97129 THER IVNTJ 1ST 15 MIN: CPT

## 2021-04-22 PROCEDURE — 97130 THER IVNTJ EA ADDL 15 MIN: CPT

## 2021-04-22 NOTE — THERAPY TREATMENT NOTE
Outpatient Speech Language Pathology   Adult Speech Language Cognitive Treatment Note  [facility]     Patient Name: Chad Hansen  : 1950  MRN: [id number]  Today's Date: 2021         Visit Date: 2021   Patient Active Problem List   Diagnosis   • Nontraumatic acute cerebral hemorrhage (CMS/HCC)   • Hypertension   • Irregular heartbeat          Visit Dx:    ICD-10-CM ICD-9-CM   1. 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 Nontraumatic acute cerebral hemorrhage (CMS/HCC)  I61.9 431        Patient was wearing a face mask during this therapy encounter. Therapist used appropriate personal protective equipment including mask, eye protection and gloves.  Mask used was standard procedure mask. Appropriate PPE was worn during the entire therapy session. Hand hygiene was completed before and after therapy session. Patient is not in enhanced droplet precautions.     SLP OP Goals     Row Name 21 1000          Subjective Pain    Able to rate subjective pain?  yes  -HS     Pre-Treatment Pain Level  0  -HS     Post-Treatment Pain Level  0  -HS        Executive Function Goals    Patient will improve executive functioning skills by designing a self-instructional technique and/or choose a metacognitive strategy that will assist with completing novel tasks  90%:;without cues  -HS     Status: Patient will improve executive functioning skills by designing a self-instructional technique and/or choose a metacognitive strategy that will assist with completing novel tasks  -- Ongoing  -HS     Comments: Patient will improve executive functioning skills by designing a self-instructional technique and/or choose a metacognitive strategy that will assist with completing novel tasks  Completed education on executive functioning, including strategies such as breaking tasks down, establishing routines, and planning for challenges. Handouts provided.     -HS        Memory Goals     Patient will demonstrate improved ability to recall information by listening to paragraph and answering yes/no questions  90%:;without cues  -HS     Status: Patient will demonstrate improved ability to recall information by listening to paragraph and answering yes/no questions  -- Ongoing   -HS     Comments: Patient will demonstrate improved ability to recall information by listening to paragraph and answering yes/no questions  Completed education on the brain and memory. Discussed sensory memory, working memory, short-term and long-term memory. Patient completed a working memory task with 100% accuracy without cues. Provided handouts and working memory exercises for independent practice with learned strategies and techniques.   -HS        Problem Solving Goals    Patient will improve ability to analyze problems and determine solutions by demonstrating ability to complete household/work management tasks in an organized approach  90%:;without cues  -HS     Status: Patient will improve ability to analyze problems and determine solutions by demonstrating ability to complete household/work management tasks in an organized approach  Progressing as expected Partially Met   -HS     Comments: Patient will improve ability to analyze problems and determine solutions by demonstrating ability to complete household/work management tasks in an organized approach  100% on finance task (check book balancing) without cues. Goal criteria met x1 session. Will continue goal for carryover/generalization. Discussed medication management. Patient states he is independently managing his medications at this time and has no concerns.   -HS     Patient will improve ability to analyze problems and determine solutions by completing a visual representation/filling in a chart by following  written directions  90%:;without cues  -HS     Comments: Patient will improve ability to analyze problems and determine solutions by completing a visual  representation/filling in a chart by following  written directions  Did not complete this session due to time constraints.   -HS       User Key  (r) = Recorded By, (t) = Taken By, (c) = Cosigned By    Initials Name Provider Type    ISELA BensonjoeAline MS CCC-SLP Speech and Language Pathologist          OP SLP Education     Row Name 04/22/21 1203       Education    Education Provided  -- Carryover ideas/activites  -HS    Assessed  Learning motivation  -HS    Learning Motivation  Strong  -HS    Learning Method  Explanation;Demonstration;Written materials  -HS    Teaching Response  Verbalized understanding;Demonstrated understanding  -HS      User Key  (r) = Recorded By, (t) = Taken By, (c) = Cosigned By    Initials Name Effective Dates    Aline Guerrero MS CCC-SLP 06/08/18 -           OP SLP Assessment/Plan - 04/22/21 1203        SLP Plan    Plan Comments  Continue with current plan of care. Good progress towards goals. Anticipate short term SLP needs with transition to home maintenance program in the next 3-4 visits.   -HS      User Key  (r) = Recorded By, (t) = Taken By, (c) = Cosigned By    Initials Name Provider Type    Aline Guerrero MS CCC-SLP Speech and Language Pathologist        Time Calculation:   SLP Start Time: 1015  SLP Stop Time: 1100  SLP Time Calculation (min): 45 min    Therapy Charges for Today     Code Description Service Date Service Provider Modifiers Qty    11616376726 HC ST DEV OF COGN SKILLS EACH ADDT'L 15 MIN 4/22/2021 Aline Milton MS CCC-SLP  2    18639741286 HC ST DEV OF COGN SKILLS INITIAL 15 MIN 4/22/2021 Aline Milton MS CCC-SLP  1               Aline Milton MS CCC-SLP  4/22/2021

## 2021-04-27 ENCOUNTER — HOSPITAL ENCOUNTER (OUTPATIENT)
Dept: SPEECH THERAPY | Facility: HOSPITAL | Age: 71
Setting detail: THERAPIES SERIES
Discharge: HOME | End: 2021-04-27

## 2021-04-27 DIAGNOSIS — I61.9 NONTRAUMATIC ACUTE CEREBRAL HEMORRHAGE (HCC): ICD-10-CM

## 2021-04-27 DIAGNOSIS — I69.119 COGNITIVE DEFICITS FOLLOWING NONTRAUMATIC INTRACEREBRAL HEMORRHAGE: Primary | ICD-10-CM

## 2021-04-27 PROCEDURE — 97130 THER IVNTJ EA ADDL 15 MIN: CPT

## 2021-04-27 PROCEDURE — 97129 THER IVNTJ 1ST 15 MIN: CPT

## 2021-04-27 NOTE — THERAPY TREATMENT NOTE
Outpatient Speech Language Pathology   Adult Speech Language Cognitive Treatment Note  [facility]     Patient Name: Chad Hansen  : 1950  MRN: [id number]  Today's Date: 2021         Visit Date: 2021   Patient Active Problem List   Diagnosis   • Nontraumatic acute cerebral hemorrhage (CMS/HCC)   • Hypertension   • Irregular heartbeat          Visit Dx:    ICD-10-CM ICD-9-CM   1. 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 Nontraumatic acute cerebral hemorrhage (CMS/HCC)  I61.9 431     Patient was wearing a face mask during this therapy encounter. Therapist used appropriate personal protective equipment including mask, eye protection and gloves.  Mask used was standard procedure mask. Appropriate PPE was worn during the entire therapy session. Hand hygiene was completed before and after therapy session. Patient is not in enhanced droplet precautions.     SLP OP Goals     Row Name 21 1000          Subjective Comments    Subjective Comments  Chad is pleasant and cooperative. He demonstrated good effort during today's therapy session. He reports low blood pressure readings. SLP advised patient to call and discuss with PCP.  -HS        Subjective Pain    Able to rate subjective pain?  yes  -HS     Pre-Treatment Pain Level  0  -HS     Post-Treatment Pain Level  0  -HS        Executive Function Goals    Patient will improve executive functioning skills by designing a self-instructional technique and/or choose a metacognitive strategy that will assist with completing novel tasks  90%:;without cues  -HS     Status: Patient will improve executive functioning skills by designing a self-instructional technique and/or choose a metacognitive strategy that will assist with completing novel tasks  Progressing as expected  -HS     Comments: Patient will improve executive functioning skills by designing a self-instructional technique and/or choose a metacognitive  strategy that will assist with completing novel tasks  Completed deductive reasoning task with min cues at 100% accuracy. Without cues accuracy was 70%.    -HS        Memory Goals    Patient will demonstrate improved ability to recall information by listening to paragraph and answering yes/no questions  90%:;without cues  -HS     Status: Patient will demonstrate improved ability to recall information by listening to paragraph and answering yes/no questions  Progressing as expected Partially Met   -HS     Comments: Patient will demonstrate improved ability to recall information by listening to paragraph and answering yes/no questions  90% on voicemail task (utilized [employer] iPad maico). Patient required consistent repetition of stimulus items. Discussed use of note taking as an external memory aide. Goal criteria met x1 session. Continue goal for carryover/generalization.   -HS        Problem Solving Goals    Problem Solving STG's  Patient will improve ability to analyze problems and determine solutions by completing a visual representation/filling in a chart by following  written directions;Patient will improve ability to analyze problems and determine solutions by demonstrating ability to complete household/work management tasks in an organized approach  -HS     Patient will improve ability to analyze problems and determine solutions by demonstrating ability to complete household/work management tasks in an organized approach  90%:;without cues  -HS     Status: Patient will improve ability to analyze problems and determine solutions by demonstrating ability to complete household/work management tasks in an organized approach  Achieved  -HS     Comments: Patient will improve ability to analyze problems and determine solutions by demonstrating ability to complete household/work management tasks in an organized approach  100% on comprehension of road signs activity independently. 100% on verbal problem solving  - driving scenarios. Goal achieved.   -HS     Patient will improve ability to analyze problems and determine solutions by completing a visual representation/filling in a chart by following  written directions  90%:;without cues  -HS     Status: Patient will improve ability to analyze problems and determine solutions by completing a visual representation/filling in a chart by following  written directions  Progressing as expected Partially Met   -HS     Comments: Patient will improve ability to analyze problems and determine solutions by completing a visual representation/filling in a chart by following  written directions  Completed map reading task with 100% accuracy independently. Goal criteria met x1 session. Will continue goal for carryover/generalization.   -HS       User Key  (r) = Recorded By, (t) = Taken By, (c) = Cosigned By    Initials Name Provider Type    Aline Guerrero MS CCC-SLP Speech and Language Pathologist        OP SLP Education     Row Name 04/27/21 1041       Education    Barriers to Learning  No barriers identified  -HS    Education Provided  -- Carryover ideas. Progress towards goals. Plan of Care.  -HS    Assessed  Learning motivation  -HS    Learning Motivation  Strong  -HS    Learning Method  Explanation  -HS    Teaching Response  Verbalized understanding  -HS      User Key  (r) = Recorded By, (t) = Taken By, (c) = Cosigned By    Initials Name Effective Dates    HS Aline Milton MS CCC-SLP 06/08/18 -         OP SLP Assessment/Plan - 04/27/21 1040        SLP Plan    Plan Comments  Good progress towards goals. Continue with current plan of care. Transition to home maintenance program in 2-3 visits.   -HS      User Key  (r) = Recorded By, (t) = Taken By, (c) = Cosigned By    Initials Name Provider Type    Aline Guerrero MS CCC-SLP Speech and Language Pathologist         Time Calculation:   SLP Start Time: 1015  SLP Stop Time: 1100  SLP Time Calculation (min): 45 min  Timed  Charges  07881-VT Dev of Cogn Skills Initial Minutes: 15  57881-UM Dev of Cogn Skills Add Minutes: 30  Total Minutes  Timed Charges Total Minutes: 45   Total Minutes: 45    Therapy Charges for Today     Code Description Service Date Service Provider Modifiers Qty    10734206973 HC ST DEV OF COGN SKILLS EACH ADDT'L 15 MIN 4/27/2021 Aline Milton MS CCC-SLP  2    17800400754  ST DEV OF COGN SKILLS INITIAL 15 MIN 4/27/2021 Aline Milton MS CCC-SLP  1             Aline Milton MS CCC-SLP  4/27/2021

## 2021-04-29 ENCOUNTER — HOSPITAL ENCOUNTER (OUTPATIENT)
Dept: SPEECH THERAPY | Facility: HOSPITAL | Age: 71
Setting detail: THERAPIES SERIES
Discharge: HOME | End: 2021-04-29

## 2021-04-29 DIAGNOSIS — I69.119 COGNITIVE DEFICITS FOLLOWING NONTRAUMATIC INTRACEREBRAL HEMORRHAGE: Primary | ICD-10-CM

## 2021-04-29 DIAGNOSIS — I61.9 NONTRAUMATIC ACUTE CEREBRAL HEMORRHAGE (HCC): ICD-10-CM

## 2021-04-29 PROCEDURE — 97129 THER IVNTJ 1ST 15 MIN: CPT

## 2021-04-29 PROCEDURE — 97130 THER IVNTJ EA ADDL 15 MIN: CPT

## 2021-04-29 NOTE — THERAPY TREATMENT NOTE
Outpatient Speech Language Pathology   Adult Speech Language Cognitive Treatment Note  [facility]     Patient Name: Chad Hansen  : 1950  MRN: [id number]  Today's Date: 2021         Visit Date: 2021   Patient Active Problem List   Diagnosis   • Nontraumatic acute cerebral hemorrhage (CMS/HCC)   • Hypertension   • Irregular heartbeat          Visit Dx:    ICD-10-CM ICD-9-CM   1. 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 Nontraumatic acute cerebral hemorrhage (CMS/HCC)  I61.9 431      Patient was wearing a face mask during this therapy encounter. Therapist used appropriate personal protective equipment including mask, eye protection and gloves.  Mask used was standard procedure mask. Appropriate PPE was worn during the entire therapy session. Hand hygiene was completed before and after therapy session. Patient is not in enhanced droplet precautions.     SLP OP Goals     Row Name 21 1000          Subjective Comments  Chad is pleasant and cooperative. He demonstrated good effort during today's therapy session.   -HS          Able to rate subjective pain?  yes  -HS    Pre-Treatment Pain Level  0  -HS    Post-Treatment Pain Level  0  -HS          Patient will improve executive functioning skills by designing a self-instructional technique and/or choose a metacognitive strategy that will assist with completing novel tasks  90%:;without cues  -HS    Status: Patient will improve executive functioning skills by designing a self-instructional technique and/or choose a metacognitive strategy that will assist with completing novel tasks  Progressing as expected Partially Met   -HS    Comments: Patient will improve executive functioning skills by designing a self-instructional technique and/or choose a metacognitive strategy that will assist with completing novel tasks  100% on prescription label task without cues. 94% on calendar task (moderately complex).  "Goal criteria met x1 session. Will continue goal for carryover/generalization.     -HS          Patient will demonstrate improved ability to recall information by listening to paragraph and answering yes/no questions  90%:;without cues  -HS    Status: Patient will demonstrate improved ability to recall information by listening to paragraph and answering yes/no questions  Progressing as expected  -HS    Comments: Patient will demonstrate improved ability to recall information by listening to paragraph and answering yes/no questions  90% on \"understanding short stories\" level 3 out of 6 ([employer] iPad maico). Patient required consistent repetition of stimulus items. Goal criteria met x2 sessions. Continue goal x1 more session to establish carryover/generalization.  -HS          Problem Solving LTG's  Patient will be able to execute all activities necessary to manage a home  -HS    Patient will be able to execute all activities necessary to manage a home  Independently  -HS    Status: Patient will be able to execute all activities necessary to manage a home  Achieved  -HS    Comments: Patient will be able to execute all activities necessary to manage a home  Patient met his short term problem solving goals. He demonstrates appropriate problem solving for functional tasks. He may require additional time for completion of moderate to complex problems.   -HS    Problem Solving STG's  Patient will improve ability to analyze problems and determine solutions by completing a visual representation/filling in a chart by following  written directions;Patient will improve ability to analyze problems and determine solutions by demonstrating ability to complete household/work management tasks in an organized approach  -HS    Patient will improve ability to analyze problems and determine solutions by demonstrating ability to complete household/work management tasks in an organized approach  90%:;without cues  -HS    Status: " Patient will improve ability to analyze problems and determine solutions by demonstrating ability to complete household/work management tasks in an organized approach  Achieved  -HS    Comments: Patient will improve ability to analyze problems and determine solutions by demonstrating ability to complete household/work management tasks in an organized approach  Achieved 4/27/21  -HS    Patient will improve ability to analyze problems and determine solutions by completing a visual representation/filling in a chart by following  written directions  90%:;without cues  -HS    Status: Patient will improve ability to analyze problems and determine solutions by completing a visual representation/filling in a chart by following  written directions  Achieved  -HS    Comments: Patient will improve ability to analyze problems and determine solutions by completing a visual representation/filling in a chart by following  written directions  100% on numerical sequencing task (moderately complex) without cues. Goal criteria met x2 sessions.   -HS      User Key  (r) = Recorded By, (t) = Taken By, (c) = Cosigned By    Initials Name Provider Type    Aline Guerrero MS CCC-SLP Speech and Language Pathologist        OP SLP Education     Row Name 04/29/21 1113       Education    Education Provided  -- Progress towards goals  -HS    Assessed  Learning motivation  -HS    Learning Motivation  Strong  -    Learning Method  Explanation  -    Teaching Response  Verbalized understanding  -HS      User Key  (r) = Recorded By, (t) = Taken By, (c) = Cosigned By    Initials Name Effective Dates    Aline Guerrero MS CCC-SLP 06/08/18 -         OP SLP Assessment/Plan - 04/29/21 1112        SLP Plan    Plan Comments  Good progress towards goals. Anticipate 2 more therapy sessions to finialize goals and establish a home maintenace program.   -HS      User Key  (r) = Recorded By, (t) = Taken By, (c) = Cosigned By    Initials Name Provider  Type    HS Aline Milton MS CCC-SLP Speech and Language Pathologist          Time Calculation:   SLP Start Time: 1015  SLP Stop Time: 1100  SLP Time Calculation (min): 45 min  Timed Charges  63013-AG Dev of Cogn Skills Initial Minutes: 15  47807-TF Dev of Cogn Skills Add Minutes: 30  Total Minutes  Timed Charges Total Minutes: 45   Total Minutes: 45    Therapy Charges for Today     Code Description Service Date Service Provider Modifiers Qty    27189338307 HC ST DEV OF COGN SKILLS EACH ADDT'L 15 MIN 4/29/2021 Aline Milton MS CCC-SLP  2    96243159904 HC ST DEV OF COGN SKILLS INITIAL 15 MIN 4/29/2021 Aline Milton MS CCC-SLP  1               Aline Milton MS CCC-SLP  4/29/2021

## 2021-05-04 ENCOUNTER — HOSPITAL ENCOUNTER (OUTPATIENT)
Dept: SPEECH THERAPY | Facility: HOSPITAL | Age: 71
Setting detail: THERAPIES SERIES
Discharge: HOME | End: 2021-05-04

## 2021-05-04 DIAGNOSIS — I69.119 COGNITIVE DEFICITS FOLLOWING NONTRAUMATIC INTRACEREBRAL HEMORRHAGE: Primary | ICD-10-CM

## 2021-05-04 DIAGNOSIS — I61.9 NONTRAUMATIC ACUTE CEREBRAL HEMORRHAGE (HCC): ICD-10-CM

## 2021-05-04 PROCEDURE — 97130 THER IVNTJ EA ADDL 15 MIN: CPT

## 2021-05-04 PROCEDURE — 97129 THER IVNTJ 1ST 15 MIN: CPT

## 2021-05-04 NOTE — THERAPY TREATMENT NOTE
Outpatient Speech Language Pathology   Adult Speech Language Cognitive Treatment Note  [facility]     Patient Name: Chad Hansen  : 1950  MRN: [id number]  Today's Date: 2021         Visit Date: 2021   Patient Active Problem List   Diagnosis   • Nontraumatic acute cerebral hemorrhage (CMS/HCC)   • Hypertension   • Irregular heartbeat        Visit Dx:    ICD-10-CM ICD-9-CM   1. 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 Nontraumatic acute cerebral hemorrhage (CMS/HCC)  I61.9 431      Patient was wearing a face mask during this therapy encounter. Therapist used appropriate personal protective equipment including mask, eye protection and gloves.  Mask used was standard procedure mask. Appropriate PPE was worn during the entire therapy session. Hand hygiene was completed before and after therapy session. Patient is not in enhanced droplet precautions.     SLP OP Goals     Row Name 21 1000          Subjective Comments    Subjective Comments  Chad is pleasant and cooperative. He demonstrated good effort during today's therapy session. Anticipate discharge next session. Note: patient states he saw his PCP yesterday and that his blood pressure medications were adjusted slightly as he has been having low readings. He reports follow-up with PCP in early .   -HS        Subjective Pain    Able to rate subjective pain?  yes  -HS     Pre-Treatment Pain Level  0  -HS     Post-Treatment Pain Level  0  -HS        Executive Function Goals    Executive Function STG's  Patient will improve executive functioning skills by designing a self-instructional technique and/or choose a metacognitive strategy that will assist with completing novel tasks  -HS     Patient will improve executive functioning skills by designing a self-instructional technique and/or choose a metacognitive strategy that will assist with completing novel tasks  90%:;without cues  -HS     Status:  Patient will improve executive functioning skills by designing a self-instructional technique and/or choose a metacognitive strategy that will assist with completing novel tasks  Achieved  -HS     Comments: Patient will improve executive functioning skills by designing a self-instructional technique and/or choose a metacognitive strategy that will assist with completing novel tasks  90% on two-step complex numerical sequences without cues. 90% on deducation by elimination activity (calendar based). Increased time for completion. Goal criteria met x2 sessions.   -HS        Memory Goals    Memory STG's  Patient will demonstrate improved ability to recall information by listening to paragraph and answering yes/no questions  -HS     Patient will demonstrate improved ability to recall information by listening to paragraph and answering yes/no questions  90%:;without cues  -HS     Status: Patient will demonstrate improved ability to recall information by listening to paragraph and answering yes/no questions  Progressing as expected Partially Met   -HS     Comments: Patient will demonstrate improved ability to recall information by listening to paragraph and answering yes/no questions  96% on answering inferencing questions related to short stories presented auditorily. No repetitions of stimulus items allowed. Goal criteria met x1 session. Continue goal for carryover/generalization.   -HS        Problem Solving Goals    Problem Solving LTG's  Patient will be able to execute all activities necessary to manage a home  -HS     Patient will be able to execute all activities necessary to manage a home  Independently  -HS     Status: Patient will be able to execute all activities necessary to manage a home  Achieved  -HS     Comments: Patient will be able to execute all activities necessary to manage a home  Patient met his short term problem solving goals. He demonstrates appropriate problem solving for functional tasks. He  may require additional time for completion of moderate to complex problems.   -HS     Problem Solving STG's  Patient will improve ability to analyze problems and determine solutions by completing a visual representation/filling in a chart by following  written directions;Patient will improve ability to analyze problems and determine solutions by demonstrating ability to complete household/work management tasks in an organized approach  -HS     Patient will improve ability to analyze problems and determine solutions by demonstrating ability to complete household/work management tasks in an organized approach  90%:;without cues  -HS     Status: Patient will improve ability to analyze problems and determine solutions by demonstrating ability to complete household/work management tasks in an organized approach  Achieved  -HS     Comments: Patient will improve ability to analyze problems and determine solutions by demonstrating ability to complete household/work management tasks in an organized approach  Achieved 4/27/21  -HS     Patient will improve ability to analyze problems and determine solutions by completing a visual representation/filling in a chart by following  written directions  90%:;without cues  -HS     Status: Patient will improve ability to analyze problems and determine solutions by completing a visual representation/filling in a chart by following  written directions  Achieved  -HS     Comments: Patient will improve ability to analyze problems and determine solutions by completing a visual representation/filling in a chart by following  written directions  Acheived 4/29/21.   -HS       User Key  (r) = Recorded By, (t) = Taken By, (c) = Cosigned By    Initials Name Provider Type    Aline Guerrero MS CCC-SLP Speech and Language Pathologist        OP SLP Education     Row Name 05/04/21 9231       Education    Education Provided  -- Progress towards goals. Plan for discharge.  -HS    Assessed   Learning motivation  -HS    Learning Motivation  Strong  -HS    Learning Method  Explanation  -HS    Teaching Response  Verbalized understanding  -HS      User Key  (r) = Recorded By, (t) = Taken By, (c) = Cosigned By    Initials Name Effective Dates    HS Aline Milton MS CCC-SLP 06/08/18 -         OP SLP Assessment/Plan - 05/04/21 1041        SLP Plan    Plan Comments  Patient has met or partially met all goals. Plan to discharge next session. Will provide home maintenance program for continued practice with learned strategies/techniques.   -HS      User Key  (r) = Recorded By, (t) = Taken By, (c) = Cosigned By    Initials Name Provider Type    HS Aline Milton MS CCC-SLP Speech and Language Pathologist          Time Calculation:   SLP Start Time: 1010  SLP Stop Time: 1055  SLP Time Calculation (min): 45 min  Timed Charges  54896-DB Dev of Cogn Skills Initial Minutes: 15  47842-FZ Dev of Cogn Skills Add Minutes: 30  Total Minutes  Timed Charges Total Minutes: 45   Total Minutes: 45    Therapy Charges for Today     Code Description Service Date Service Provider Modifiers Qty    13075510381 HC ST DEV OF COGN SKILLS EACH ADDT'L 15 MIN 5/4/2021 Aline Milton MS CCC-SLP  2    [id number] HC ST DEV OF COGN SKILLS INITIAL 15 MIN 5/4/2021 Aline Milton MS CCC-SLP  1              Aline Milton MS CCC-SLP  5/4/2021

## 2021-05-06 ENCOUNTER — HOSPITAL ENCOUNTER (OUTPATIENT)
Dept: SPEECH THERAPY | Facility: HOSPITAL | Age: 71
Setting detail: THERAPIES SERIES
Discharge: HOME | End: 2021-05-06

## 2021-05-06 DIAGNOSIS — I61.9 NONTRAUMATIC ACUTE CEREBRAL HEMORRHAGE (HCC): ICD-10-CM

## 2021-05-06 DIAGNOSIS — I69.119 COGNITIVE DEFICITS FOLLOWING NONTRAUMATIC INTRACEREBRAL HEMORRHAGE: Primary | ICD-10-CM

## 2021-05-06 PROCEDURE — 97130 THER IVNTJ EA ADDL 15 MIN: CPT

## 2021-05-06 PROCEDURE — 97129 THER IVNTJ 1ST 15 MIN: CPT

## 2021-05-07 NOTE — THERAPY DISCHARGE NOTE
Outpatient Speech Language Pathology   Adult Speech Language Cognitive Treatment Note/Discharge Summary  [facility]     Patient Name: Chad Hansen  : 1950  MRN: [id number]  Today's Date: 2021         Visit Date: 2021   Patient Active Problem List   Diagnosis   • Nontraumatic acute cerebral hemorrhage (CMS/HCC)   • Hypertension   • Irregular heartbeat          Visit Dx:    ICD-10-CM ICD-9-CM   1. 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 Nontraumatic acute cerebral hemorrhage (CMS/HCC)  I61.9 431     Patient and wife were wearing face masks during this therapy encounter. Therapist used appropriate personal protective equipment including mask, eye protection and gloves.  Mask used was standard procedure mask. Appropriate PPE was worn during the entire therapy session. Hand hygiene was completed before and after therapy session. Patient is not in enhanced droplet precautions.     SLP OP Goals     Row Name 21 1200          Subjective Comments    Subjective Comments  Chad is pleasant and cooperative. His wife accompanied him to 's therapy session. They are anxious for him to return to driving. SLP recommended the patient contact his MD to discuss medical release.  -HS        Subjective Pain    Able to rate subjective pain?  yes  -HS     Pre-Treatment Pain Level  0  -HS     Post-Treatment Pain Level  0  -HS        Executive Function Goals    Executive Function LTG's  Patient will be able to participate in the community safely  -HS     Patient will be able to participate in the community safely  without cues  -HS     Status: Patient will be able to participate in the community safely  Achieved  -HS     Comments: Patient will be able to participate in the community safely  The patient demonstrates appropriate self-monitoring skills. He is able to generate strategies in order to complete moderately complex tasks. He may occasionally require  additional time for task completion.   -HS     Executive Function STG's  Patient will improve executive functioning skills by designing a self-instructional technique and/or choose a metacognitive strategy that will assist with completing novel tasks  -HS     Patient will improve executive functioning skills by designing a self-instructional technique and/or choose a metacognitive strategy that will assist with completing novel tasks  90%:;without cues  -HS     Status: Patient will improve executive functioning skills by designing a self-instructional technique and/or choose a metacognitive strategy that will assist with completing novel tasks  Achieved  -HS     Comments: Patient will improve executive functioning skills by designing a self-instructional technique and/or choose a metacognitive strategy that will assist with completing novel tasks  Achieved 5/4/21  -HS        Memory Goals    Memory LTG's  Patient will be able to remember information needed to participate in avocational activities  -HS     Status: Patient will be able to remember information needed to participate in avocational activities  Achieved  -HS     Comments: Patient will be able to remember information needed to participate in avocational activities  The patient demonstrates appropriate use of internal memory strategies. He asks for repetitions/clarifications if needed. May require the use of external memory aides for complex information.  -HS     Memory STG's  Patient will demonstrate improved ability to recall information by listening to paragraph and answering yes/no questions  -HS     Patient will demonstrate improved ability to recall information by listening to paragraph and answering yes/no questions  90%:;without cues  -HS     Status: Patient will demonstrate improved ability to recall information by listening to paragraph and answering yes/no questions  Achieved  -HS     Comments: Patient will demonstrate improved ability to recall  information by listening to paragraph and answering yes/no questions  100% on recall of conversational information independently. Goal criteria met x2 sessions.   -HS        Problem Solving Goals    Problem Solving LTG's  Patient will be able to execute all activities necessary to manage a home  -HS     Patient will be able to execute all activities necessary to manage a home  Independently  -HS     Status: Patient will be able to execute all activities necessary to manage a home  Achieved  -HS     Comments: Patient will be able to execute all activities necessary to manage a home  Patient met his short term problem solving goals. He demonstrates appropriate problem solving for functional tasks. He may require additional time for completion of moderate to complex problems.   -HS     Problem Solving STG's  Patient will improve ability to analyze problems and determine solutions by completing a visual representation/filling in a chart by following  written directions;Patient will improve ability to analyze problems and determine solutions by demonstrating ability to complete household/work management tasks in an organized approach  -HS     Patient will improve ability to analyze problems and determine solutions by demonstrating ability to complete household/work management tasks in an organized approach  90%:;without cues  -HS     Status: Patient will improve ability to analyze problems and determine solutions by demonstrating ability to complete household/work management tasks in an organized approach  Achieved  -HS     Comments: Patient will improve ability to analyze problems and determine solutions by demonstrating ability to complete household/work management tasks in an organized approach  Achieved 4/27/21  -HS     Patient will improve ability to analyze problems and determine solutions by completing a visual representation/filling in a chart by following  written directions  90%:;without cues  -HS     Status:  Patient will improve ability to analyze problems and determine solutions by completing a visual representation/filling in a chart by following  written directions  Achieved  -HS     Comments: Patient will improve ability to analyze problems and determine solutions by completing a visual representation/filling in a chart by following  written directions  Acheived 4/29/21.   -HS       User Key  (r) = Recorded By, (t) = Taken By, (c) = Cosigned By    Initials Name Provider Type     BensonAlnie diaz MS CCC-SLP Speech and Language Pathologist          OP SLP Education     Row Name 05/06/21 1055       Education    Education Provided  -- Home maintenance program, reviewed strategies/techniques for high level cognition  -HS    Assessed  Learning motivation  -HS    Learning Motivation  Strong  -HS    Learning Method  Explanation;Demonstration;Written materials  -HS    Teaching Response  Verbalized understanding;Demonstrated understanding  -HS      User Key  (r) = Recorded By, (t) = Taken By, (c) = Cosigned By    Initials Name Effective Dates     Aline Milton MS CCC-SLP 06/08/18 -           OP SLP Assessment/Plan - 05/06/21 1055        SLP Assessment    Clinical Impression: Speech Language-Adult/Congnition  Minimal:;Cognitive Communication Impairment   -HS       SLP Plan    Plan Comments  All goals met. Discharge this date.   -HS      User Key  (r) = Recorded By, (t) = Taken By, (c) = Cosigned By    Initials Name Provider Type    ISELA Aline Milton MS CCC-SLP Speech and Language Pathologist           Time Calculation:   SLP Start Time: 1015  SLP Stop Time: 1045  SLP Time Calculation (min): 30 min  Timed Charges  23981-BF Dev of Cogn Skills Initial Minutes: 15  63704-RK Dev of Cogn Skills Add Minutes: 15  Total Minutes  Timed Charges Total Minutes: 30   Total Minutes: 30    Therapy Charges for Today     Code Description Service Date Service Provider Modifiers Qty    [id number]  ST DEV OF COGN SKILLS EACH ADDT'L  15 MIN 5/6/2021 Aline Milton MS CCC-SLP  1    [id number]  ST DEV OF COGN SKILLS INITIAL 15 MIN 5/6/2021 Aline Milton MS CCC-SLP  1          OP SLP Discharge Summary  Date of Discharge: 05/06/21  Reason for Discharge: all goals and outcomes met, no further needs identified  Progress Toward Achieving Short/long Term Goals: all goals met within established timelines  Discharge Destination: home      MS ASHA FerrisSLP  5/6/2021

## 2021-05-11 ENCOUNTER — APPOINTMENT (OUTPATIENT)
Dept: SPEECH THERAPY | Facility: HOSPITAL | Age: 71
End: 2021-05-11

## 2021-05-13 ENCOUNTER — APPOINTMENT (OUTPATIENT)
Dept: SPEECH THERAPY | Facility: HOSPITAL | Age: 71
End: 2021-05-13

## 2021-05-18 ENCOUNTER — APPOINTMENT (OUTPATIENT)
Dept: SPEECH THERAPY | Facility: HOSPITAL | Age: 71
End: 2021-05-18

## 2021-05-20 ENCOUNTER — APPOINTMENT (OUTPATIENT)
Dept: SPEECH THERAPY | Facility: HOSPITAL | Age: 71
End: 2021-05-20

## 2021-05-25 ENCOUNTER — APPOINTMENT (OUTPATIENT)
Dept: SPEECH THERAPY | Facility: HOSPITAL | Age: 71
End: 2021-05-25

## 2021-05-27 ENCOUNTER — APPOINTMENT (OUTPATIENT)
Dept: SPEECH THERAPY | Facility: HOSPITAL | Age: 71
End: 2021-05-27

## 2021-06-01 ENCOUNTER — APPOINTMENT (OUTPATIENT)
Dept: SPEECH THERAPY | Facility: HOSPITAL | Age: 71
End: 2021-06-01

## 2021-06-03 ENCOUNTER — APPOINTMENT (OUTPATIENT)
Dept: SPEECH THERAPY | Facility: HOSPITAL | Age: 71
End: 2021-06-03

## 2021-06-08 ENCOUNTER — APPOINTMENT (OUTPATIENT)
Dept: SPEECH THERAPY | Facility: HOSPITAL | Age: 71
End: 2021-06-08

## 2021-06-10 ENCOUNTER — APPOINTMENT (OUTPATIENT)
Dept: SPEECH THERAPY | Facility: HOSPITAL | Age: 71
End: 2021-06-10

## 2021-06-15 ENCOUNTER — APPOINTMENT (OUTPATIENT)
Dept: SPEECH THERAPY | Facility: HOSPITAL | Age: 71
End: 2021-06-15

## 2021-06-17 ENCOUNTER — APPOINTMENT (OUTPATIENT)
Dept: SPEECH THERAPY | Facility: HOSPITAL | Age: 71
End: 2021-06-17

## 2021-06-23 RX ORDER — AMLODIPINE BESYLATE 5 MG/1
5 TABLET ORAL
Qty: 30 TABLET | Refills: 0 | Status: SHIPPED | OUTPATIENT
Start: 2021-06-23

## 2023-09-05 NOTE — DISCHARGE SUMMARY
[facility] - REHABILITATION UNIT    SALLY KEE  1950    ADMIT DATE:  4/13/2021  5:24 PM  DISCHARGE DATE:  4/17/2021      CHIEF COMPLAINT:    Status post right frontoparietal intraparenchymal hemorrhage-4.5 x 1.7 cm-April 5, 2021  Hypertension-l   Emphysema-bronchodilator inhaler  Impaired cognition/mobility/self-care  Sundowning-Seroquel low-dose at night  DVT prophylaxis-SCDs    HISTORY OF PRESENT ILLNESS:    Patient is a 71-year-old male who was admitted to [facility] on April 5, 2021 with a right frontal intraparenchymal hemorrhage with surrounding edema and brain compression.  Neurology was consulted.  Diagnostic angiogram did not reveal any source of the bleeding.  No aneurysm.  Plans for repeat MRI as an outpatient in 3 months.  Patient was initially on a Cardene drip for hypertension.  Started on lisinopril which was titrated up.  Also on a beta-blocker added as well.  He has had as needed labetalol and hydralazine.  Overall goal systolic blood pressure less than 140.  He had prolonged QT that improved with IV magnesium.  Has had sundowning that improved with low-dose Seroquel.  His wife is to stay with him the first night on the rehab unit.  He is in a room close to the nurses station.  Emphysema was noted on his chest x-ray.  Pulmonary recommended outpatient pulmonary function test and also 6-minute walk test before discharge from rehab.     The patient has shown improvement in his cognition.  He is oriented.  Does not describe any significant headache.  Does not describe any focal vision complaints or focal weakness or numbness.  He has had some impulsivity.  His wife attributes some of that to being on the diuretic at the outside hospital.  Has had expressive aphasia that shown improvement.  He is ambulated contact-guard min assist, unsteady at times.  Required assistance with lower body activities of daily living.  Given his functional impairments and comorbidities  he is admitted for acute inpatient rehabilitation.    HOSPITAL COURSE:    Patient participated in a comprehensive acute inpatient rehabilitation program.     During the hospital stay the following areas were addressed:      Status post right frontoparietal intraparenchymal hemorrhage-4.5 x 1.7 cm-April 5, 2021     Impaired cognition/impaired mobility/impaired self-care      hypertension-April 14-blood pressure elevated 125//50.  Has been on lisinopril 40 mg daily and metoprolol 100 mg twice a day.  On hydralazine 10 mg every 6 hours as needed, systolic blood pressure greater than 160, will increase that to 20 mg as needed dose.  Add amlodipine 5 mg q. Evening.  April 15 - /72 this AM, received hydralazine 20 mg prn, 131/66 this afternoon. Internal Medicine consulted for input as anticipated discharge from rehab soon.   April 16-blood pressure pattern better over the past day     Emphysema-bronchodilator inhaler     Impaired cognition/mobility/self-care     Sundowning-Seroquel low-dose at night     DVT prophylaxis-SCDs     Transaminitis-April 14-AST/ALT increased compared to April 5.  Etiology unclear as no intervening labs.  Present medications reviewed.  Recheck later this week.  April 16 -AST/ALT trend better.     TEAM CONF - APRIL 15- BED SUP. TRANSFERS SBA -SUP. 12 STAIRS SBA-CTG. GAIT 400 FEET SBA-CTG.  TOILET AND SHOWER TRANSFES CTG. BATH CTG. LBD CTG. UBD SET UP. GROOMING SET UP.  CLQT MILDLY IMPAIRED FOR ATTENTION AND VISUAL SPATIAL, MODERATE IMPAIRED FOR MEMORY. CONTINENT BOWEL AND BLADDER. STILL HYPERTENSIVE - WILL GET INPUT FROM INTERNAL MEDICINE ON BLOOD PRESSURE MANAGEMENT AS ANTICIPATE HOME SOON FROM REHAB STANDPOINT.   ELOS- APRIL 17.      BNE (Active)-April 16  Att'n. - WNL  Exec. Fx. - Mildly Imp.  Rsng/Jgmnt - Mod. Imp. for abstract reasoning, WNL for common sense jgmnt  Arith - WNL  Visuospatial Skills - WNL  Visual Mem. - WNL  Verbal Mem. - Mildly imp. for immediate recall, Severely  Imp. for delayed  recall; improved with cues  Emot - Pt denied dep/anx     REHAB -  admit for comprehensive acute inpatient rehabilitation .  This would be an interdisciplinary program with physical therapy 1 hour,  occupational therapy 1 hour, and speech therapy 1 hour, 5 days a week.  Rehabilitation nursing for carryover, monitoring of hypertension and neurologic   status, bowel and bladder, and skin  Ongoing physician follow-up.  Weekly team conferences.      Goal is for home with outpatient   therapies.  Barrier to discharge: Impaired mobility self-care- worked on balance transfers gait ADLs to overcome.      Anticipate discharge home on Saturday, April 17.  Medications and follow-up reviewed.  No driving.  Outpatient speech therapy at [facility].         Physical Exam near the time of discharge:    MENTAL STATUS -  AWAKE / ALERT  HEENT-      LUNGS - CTA   HEART- RRR  ABD -   SOFT, NT.    EXT - NO EDEMA OR CYANOSIS  NEURO -oriented to person place time and situation.  Appropriate.  Speech is fluent.       Good coordination in his hands.  MOTOR EXAM - RUE/RLE 5/5. LUE/LLE 5/5.         RESULTS:  No results found for: POCGLU  Results from last 7 days   Lab Units 04/14/21  1001   WBC 10*3/mm3 10.27   HEMOGLOBIN g/dL 12.8*   HEMATOCRIT % 37.3*   PLATELETS 10*3/mm3 255     Results from last 7 days   Lab Units 04/16/21  0648 04/14/21  1001   SODIUM mmol/L 138 138   POTASSIUM mmol/L 4.8 4.4   CHLORIDE mmol/L 103 102   CO2 mmol/L 27.8 29.0   BUN mg/dL 18 17   CREATININE mg/dL 1.04 1.02   CALCIUM mg/dL 9.3 8.7   BILIRUBIN mg/dL 0.2 0.3   ALK PHOS U/L 123* 131*   ALT (SGPT) U/L 115* 131*   AST (SGOT) U/L 41* 68*   GLUCOSE mg/dL 79 103*       April 12-sodium 136, potassium 3.3, chloride 105, carbon dioxide 27, glucose 101, blood urea nitrogen 16, creatinine 1.13  April 8-hemoglobin A1c 5.3  April 7-WBC 14.4, hemoglobin 12.9, hematocrit 30.6, platelets 188.  April 5-total protein 7.4, BUN 4.4, calcium 8.9,  total bili 0.6, AST 31, ALT 26, alkaline phosphatase 79          Discharge Medications      New Medications      Instructions Start Date   acetaminophen 325 MG tablet  Commonly known as: TYLENOL   650 mg, Oral, Every 6 Hours PRN      amLODIPine 5 MG tablet  Commonly known as: NORVASC   5 mg, Oral, Daily Before Supper      budesonide-formoterol 160-4.5 MCG/ACT inhaler  Commonly known as: SYMBICORT   2 puffs, Inhalation, 2 Times Daily - RT      lisinopril 40 MG tablet  Commonly known as: PRINIVIL,ZESTRIL   40 mg, Oral, Every 24 Hours Scheduled      metoprolol tartrate 100 MG tablet  Commonly known as: LOPRESSOR   100 mg, Oral, Every 12 Hours Scheduled      QUEtiapine 25 MG tablet  Commonly known as: SEROquel   12.5 mg, Oral, Nightly PRN, As needed for insomnia/restlessness         Continue These Medications      Instructions Start Date   albuterol sulfate  (90 Base) MCG/ACT inhaler  Commonly known as: PROVENTIL HFA;VENTOLIN HFA;PROAIR HFA   2 puffs, Inhalation, Every 6 Hours PRN      clobetasol 0.05 % cream  Commonly known as: TEMOVATE   1 g, Topical, Daily      ezetimibe 10 MG tablet  Commonly known as: ZETIA   10 mg, Oral, Daily      fluticasone 50 MCG/ACT nasal spray  Commonly known as: FLONASE   1 spray, Nasal, Daily         Stop These Medications    hydroCHLOROthiazide 12.5 MG tablet  Commonly known as: HYDRODIURIL          Name Relationship Specialty Phone Fax Address Order              [address]  4th floor [facility]  [place] [id number] [phone]     Next Steps: Go on 4/20/2021      Instructions: You are scheduled to start outpatient Speech Therapy on Tuesday, 4/20 at 10:15 a.m. Please arrive 15 mintues early at entrance A to be screened. Your schedule will be Tuesdays and Thursdays at 10:15 a.m.       Nomi Ibarra MD   Physical Medicine and Rehabilitation [phone] [address] [place] [id number]     Next Steps:  Follow up in 1 month(s)      Marianna Billings APRN  PCP - General Nurse Practitioner [phone] [address] [place] [address]     Next Steps: Follow up      Instructions: appointment on may 3,2021 @ 2:00 pm      Morgan Torres MD   Neurosurgery [phone] [address]  [address]  [place] [address]     Next Steps: Follow up      Instructions:  will arrange appointment and call the patient when time and dates are ready.will go ahead and coordinate mri and neurosurgery appointment @ that time.spoke with fahad        Medication refills per primary care.  Follow-up with primary care for blood pressure management.    No driving.  No alcohol.    Nomi Ibarra MD       [employer]ASH [employer]

## 2023-12-06 NOTE — PROGRESS NOTES
Inpatient Rehabilitation Plan of Care Note    Plan of Care  Updated Problems/Interventions  Medical Problem(s)    BNE (Active)  Att'n. - WNL  Exec. Fx. - Mildly Imp.  Rsng/Jgmnt - Mod. Imp. for abstract reasoning, WNL for common sense jgmnt  Arith - WNL  Visuospatial Skills - WNL  Visual Mem. - WNL  Verbal Mem. - Mildly imp. for immediate recall, Severely Imp. for delayed  recall; improved with cues  Emot - Pt denied dep/anx    Signed by: Pranav Perez Psy.d     Patient reporting occasional dizziness and lightheadedness with rapid standing or standing for long periods of time  Likely related to recent acute saddle PE